# Patient Record
Sex: MALE | Race: WHITE | URBAN - METROPOLITAN AREA
[De-identification: names, ages, dates, MRNs, and addresses within clinical notes are randomized per-mention and may not be internally consistent; named-entity substitution may affect disease eponyms.]

---

## 2017-04-27 PROBLEM — Z00.00 ENCOUNTER FOR PREVENTIVE HEALTH EXAMINATION: Status: ACTIVE | Noted: 2017-04-27

## 2017-06-14 ENCOUNTER — INPATIENT (INPATIENT)
Facility: HOSPITAL | Age: 57
LOS: 0 days | Discharge: HOME | End: 2017-06-15

## 2017-06-14 DIAGNOSIS — N13.9 OBSTRUCTIVE AND REFLUX UROPATHY, UNSPECIFIED: ICD-10-CM

## 2017-06-14 DIAGNOSIS — I10 ESSENTIAL (PRIMARY) HYPERTENSION: ICD-10-CM

## 2017-06-14 DIAGNOSIS — E11.9 TYPE 2 DIABETES MELLITUS WITHOUT COMPLICATIONS: ICD-10-CM

## 2017-06-14 DIAGNOSIS — R10.9 UNSPECIFIED ABDOMINAL PAIN: ICD-10-CM

## 2017-06-14 DIAGNOSIS — R07.9 CHEST PAIN, UNSPECIFIED: ICD-10-CM

## 2017-06-14 DIAGNOSIS — E78.5 HYPERLIPIDEMIA, UNSPECIFIED: ICD-10-CM

## 2017-06-28 DIAGNOSIS — I10 ESSENTIAL (PRIMARY) HYPERTENSION: ICD-10-CM

## 2017-06-28 DIAGNOSIS — N40.0 BENIGN PROSTATIC HYPERPLASIA WITHOUT LOWER URINARY TRACT SYMPTOMS: ICD-10-CM

## 2017-06-28 DIAGNOSIS — K21.9 GASTRO-ESOPHAGEAL REFLUX DISEASE WITHOUT ESOPHAGITIS: ICD-10-CM

## 2017-06-28 DIAGNOSIS — Z79.84 LONG TERM (CURRENT) USE OF ORAL HYPOGLYCEMIC DRUGS: ICD-10-CM

## 2017-06-28 DIAGNOSIS — T36.95XA ADVERSE EFFECT OF UNSPECIFIED SYSTEMIC ANTIBIOTIC, INITIAL ENCOUNTER: ICD-10-CM

## 2017-06-28 DIAGNOSIS — R41.82 ALTERED MENTAL STATUS, UNSPECIFIED: ICD-10-CM

## 2017-06-28 DIAGNOSIS — R10.9 UNSPECIFIED ABDOMINAL PAIN: ICD-10-CM

## 2017-06-28 DIAGNOSIS — E11.9 TYPE 2 DIABETES MELLITUS WITHOUT COMPLICATIONS: ICD-10-CM

## 2017-06-28 DIAGNOSIS — R19.7 DIARRHEA, UNSPECIFIED: ICD-10-CM

## 2017-06-28 DIAGNOSIS — I25.10 ATHEROSCLEROTIC HEART DISEASE OF NATIVE CORONARY ARTERY WITHOUT ANGINA PECTORIS: ICD-10-CM

## 2017-06-28 DIAGNOSIS — N41.9 INFLAMMATORY DISEASE OF PROSTATE, UNSPECIFIED: ICD-10-CM

## 2017-06-28 DIAGNOSIS — E78.5 HYPERLIPIDEMIA, UNSPECIFIED: ICD-10-CM

## 2017-06-28 DIAGNOSIS — Z86.73 PERSONAL HISTORY OF TRANSIENT ISCHEMIC ATTACK (TIA), AND CEREBRAL INFARCTION WITHOUT RESIDUAL DEFICITS: ICD-10-CM

## 2017-06-28 DIAGNOSIS — Z86.72 PERSONAL HISTORY OF THROMBOPHLEBITIS: ICD-10-CM

## 2017-06-28 DIAGNOSIS — I25.2 OLD MYOCARDIAL INFARCTION: ICD-10-CM

## 2018-04-16 ENCOUNTER — EMERGENCY (EMERGENCY)
Facility: HOSPITAL | Age: 58
LOS: 0 days | Discharge: HOME | End: 2018-04-16
Attending: EMERGENCY MEDICINE

## 2018-04-16 VITALS
DIASTOLIC BLOOD PRESSURE: 86 MMHG | SYSTOLIC BLOOD PRESSURE: 150 MMHG | HEART RATE: 90 BPM | TEMPERATURE: 97 F | OXYGEN SATURATION: 98 % | RESPIRATION RATE: 18 BRPM | HEIGHT: 70 IN | WEIGHT: 214.07 LBS

## 2018-04-16 DIAGNOSIS — K59.00 CONSTIPATION, UNSPECIFIED: ICD-10-CM

## 2018-04-16 DIAGNOSIS — R14.0 ABDOMINAL DISTENSION (GASEOUS): ICD-10-CM

## 2018-04-16 DIAGNOSIS — R10.84 GENERALIZED ABDOMINAL PAIN: ICD-10-CM

## 2018-04-16 LAB
ALBUMIN SERPL ELPH-MCNC: 4.6 G/DL — SIGNIFICANT CHANGE UP (ref 3.5–5.2)
ALP SERPL-CCNC: 53 U/L — SIGNIFICANT CHANGE UP (ref 30–115)
ALT FLD-CCNC: 29 U/L — SIGNIFICANT CHANGE UP (ref 0–41)
ANION GAP SERPL CALC-SCNC: 13 MMOL/L — SIGNIFICANT CHANGE UP (ref 7–14)
AST SERPL-CCNC: 29 U/L — SIGNIFICANT CHANGE UP (ref 0–41)
BILIRUB SERPL-MCNC: 0.2 MG/DL — SIGNIFICANT CHANGE UP (ref 0.2–1.2)
BUN SERPL-MCNC: 33 MG/DL — HIGH (ref 10–20)
CALCIUM SERPL-MCNC: 10.1 MG/DL — SIGNIFICANT CHANGE UP (ref 8.5–10.1)
CHLORIDE SERPL-SCNC: 102 MMOL/L — SIGNIFICANT CHANGE UP (ref 98–110)
CO2 SERPL-SCNC: 24 MMOL/L — SIGNIFICANT CHANGE UP (ref 17–32)
CREAT SERPL-MCNC: 1.7 MG/DL — HIGH (ref 0.7–1.5)
GLUCOSE SERPL-MCNC: 153 MG/DL — HIGH (ref 70–99)
HCT VFR BLD CALC: 40.2 % — LOW (ref 42–52)
HGB BLD-MCNC: 13.5 G/DL — LOW (ref 14–18)
LIDOCAIN IGE QN: 22 U/L — SIGNIFICANT CHANGE UP (ref 7–60)
MCHC RBC-ENTMCNC: 29.2 PG — SIGNIFICANT CHANGE UP (ref 27–31)
MCHC RBC-ENTMCNC: 33.6 G/DL — SIGNIFICANT CHANGE UP (ref 32–37)
MCV RBC AUTO: 87 FL — SIGNIFICANT CHANGE UP (ref 80–94)
NRBC # BLD: 0 /100 WBCS — SIGNIFICANT CHANGE UP (ref 0–0)
PLATELET # BLD AUTO: 185 K/UL — SIGNIFICANT CHANGE UP (ref 130–400)
POTASSIUM SERPL-MCNC: 4.9 MMOL/L — SIGNIFICANT CHANGE UP (ref 3.5–5)
POTASSIUM SERPL-SCNC: 4.9 MMOL/L — SIGNIFICANT CHANGE UP (ref 3.5–5)
PROT SERPL-MCNC: 7.3 G/DL — SIGNIFICANT CHANGE UP (ref 6–8)
RBC # BLD: 4.62 M/UL — LOW (ref 4.7–6.1)
RBC # FLD: 14.5 % — SIGNIFICANT CHANGE UP (ref 11.5–14.5)
SODIUM SERPL-SCNC: 139 MMOL/L — SIGNIFICANT CHANGE UP (ref 135–146)
WBC # BLD: 7.78 K/UL — SIGNIFICANT CHANGE UP (ref 4.8–10.8)
WBC # FLD AUTO: 7.78 K/UL — SIGNIFICANT CHANGE UP (ref 4.8–10.8)

## 2018-04-16 RX ORDER — SODIUM CHLORIDE 9 MG/ML
1000 INJECTION INTRAMUSCULAR; INTRAVENOUS; SUBCUTANEOUS ONCE
Qty: 0 | Refills: 0 | Status: DISCONTINUED | OUTPATIENT
Start: 2018-04-16 | End: 2018-04-16

## 2018-04-16 RX ORDER — SODIUM CHLORIDE 9 MG/ML
3 INJECTION INTRAMUSCULAR; INTRAVENOUS; SUBCUTANEOUS ONCE
Qty: 0 | Refills: 0 | Status: COMPLETED | OUTPATIENT
Start: 2018-04-16 | End: 2018-04-16

## 2018-04-16 RX ADMIN — SODIUM CHLORIDE 3 MILLILITER(S): 9 INJECTION INTRAMUSCULAR; INTRAVENOUS; SUBCUTANEOUS at 17:21

## 2018-04-16 NOTE — ED PROVIDER NOTE - NS ED ROS FT
Cardiac:  No chest pain or sob.   Respiratory:  No cough or respiratory distress.  GI:  no nausea. no vomiting. no diarrhea.  (+) constipation   :  no dysuria. no burning on urination  MS:  no myalgias. no back pain  Neuro:  No headache or focal weakness  Skin:  No skin rash.

## 2018-04-16 NOTE — ED PROVIDER NOTE - MEDICAL DECISION MAKING DETAILS
CT negative for anything acute.  pt had BM and is feeling much better.  pt nontoxic, well appearing.  rectal exam with brown stool, no blood, no black stool.  pt dc with outpatient gi follow up.  pt understands importance of GI follow up.    Patient feeling better.  Pt dc with outpatient follow up.  Pt understands importance of outpatient follow up.  Pt given strict return precautions.

## 2018-04-16 NOTE — ED PROVIDER NOTE - ATTENDING CONTRIBUTION TO CARE
58 yo m who had recent colonoscopy 5 days ago presents with abd distention and pain and constipation.  pt was seen by GI who sent pt in for ct.  no fevers, noc hills, no nausea, no vomiting, no back pain, no headache, no blood in stool.  no cp, no sob.  no other complaints.  awake, alert.  lungs clear. MMM.  abd soft, distended with diffuse tenderness.    p:  ct, labs, ivf, reassess, xray

## 2018-04-16 NOTE — ED PROVIDER NOTE - OBJECTIVE STATEMENT
57 M presents for abd distention and constipation since colonoscopy on 4/11/18.  denies nausea/vomiting.  no urinary symptoms. no fever/chills

## 2018-04-16 NOTE — ED PROVIDER NOTE - PHYSICAL EXAMINATION
CONSTITUTIONAL: Well-developed; well-nourished; in no acute distress.   SKIN: warm, dry  EYES: no conj injection  ENT: No nasal discharge; airway clear.  CARD: S1, S2 normal  RESP: No wheezes, rales or rhonchi.  ABD: soft distended. diffuse tenderness. no rebound or guarding.  rectal: brown stool not impacted   EXT: Normal ROM.  normal gait.   NEURO: Alert, oriented, grossly unremarkable

## 2018-04-16 NOTE — ED PROVIDER NOTE - PROGRESS NOTE DETAILS
I spoke to RN at Dr. Gayle;'s office.  CT negative for anythign acute.  will give enema.  will continue to observe and if doing better will dc with outpatient GI Dr. Gayle follow up pt feeling better better after enema.  pt strained and had BM and feels better.  pt reported small drop of red blood with straining.  no blood in stool, no black stool.  pt states only small drop of blood.  I did a repeat rectal exam, no blood seen.  rectal with brown stool, no black stool, no melena.  pt wants to go home.  pt reports he feels better.  pt will follow up with Dr. Gayle this week.  pt understands importance of GI follow up.  pt given strict return precautions

## 2018-04-16 NOTE — ED ADULT TRIAGE NOTE - CHIEF COMPLAINT QUOTE
Pt sent in by MD for CT abd/pelvis to rule out obstruction.  Pt had colonoscopy last week and has not had a BM.

## 2018-04-17 NOTE — ED POST DISCHARGE NOTE - RESULT SUMMARY
I also informed pt of official xray finding on the lung about nodular density requiring CT scan.  Pt understands and will inform his pmd Dr. Wells about the finding and the need for outpatient Chest CT for further evaluation of this.  pt understands and will make sure to follow up.

## 2018-04-17 NOTE — ED POST DISCHARGE NOTE - ADDITIONAL DOCUMENTATION
I called pt to follow up.  Pt continues to have constipation.  no worsening symptoms.  no worsening abd pain.  no vomiting, no fevers, no chills.  no rectal bleeding, no blood in stool.  pt is using miralax at home and is going to try another enema.  pt is going to follow up with his GI this week.  pt understands importance of follow up.  pt given strict return precautions.  Pt denies any rectal bleeding or blood in stool. I called pt to follow up.  Pt continues to have constipation.  no worsening symptoms.  no worsening abd pain.  no vomiting, no fevers, no chills.  no rectal bleeding, no blood in stool.  pt is using miralax at home and is going to try another enema.  pt is going to follow up with his GI this week.  pt understands importance of follow up.  pt given strict return precautions.  Pt denies any rectal bleeding or blood in stool.  Pt also aware of slightly abnormal kidney function and IV contrast and understands importance staying hydrated over next few days.

## 2019-01-30 ENCOUNTER — INPATIENT (INPATIENT)
Facility: HOSPITAL | Age: 59
LOS: 3 days | Discharge: HOME | End: 2019-02-03
Payer: COMMERCIAL

## 2019-01-30 VITALS
TEMPERATURE: 96 F | RESPIRATION RATE: 17 BRPM | HEART RATE: 102 BPM | SYSTOLIC BLOOD PRESSURE: 127 MMHG | DIASTOLIC BLOOD PRESSURE: 60 MMHG | OXYGEN SATURATION: 96 %

## 2019-01-30 LAB
ALBUMIN SERPL ELPH-MCNC: 4.6 G/DL — SIGNIFICANT CHANGE UP (ref 3.5–5.2)
ALP SERPL-CCNC: 60 U/L — SIGNIFICANT CHANGE UP (ref 30–115)
ALT FLD-CCNC: 26 U/L — SIGNIFICANT CHANGE UP (ref 0–41)
ANION GAP SERPL CALC-SCNC: 16 MMOL/L — HIGH (ref 7–14)
APTT BLD: 27.9 SEC — SIGNIFICANT CHANGE UP (ref 27–39.2)
AST SERPL-CCNC: 18 U/L — SIGNIFICANT CHANGE UP (ref 0–41)
BASOPHILS # BLD AUTO: 0.04 K/UL — SIGNIFICANT CHANGE UP (ref 0–0.2)
BASOPHILS NFR BLD AUTO: 0.4 % — SIGNIFICANT CHANGE UP (ref 0–1)
BILIRUB SERPL-MCNC: 0.4 MG/DL — SIGNIFICANT CHANGE UP (ref 0.2–1.2)
BUN SERPL-MCNC: 44 MG/DL — HIGH (ref 10–20)
CALCIUM SERPL-MCNC: 9.4 MG/DL — SIGNIFICANT CHANGE UP (ref 8.5–10.1)
CHLORIDE SERPL-SCNC: 100 MMOL/L — SIGNIFICANT CHANGE UP (ref 98–110)
CO2 SERPL-SCNC: 24 MMOL/L — SIGNIFICANT CHANGE UP (ref 17–32)
CREAT SERPL-MCNC: 2.3 MG/DL — HIGH (ref 0.7–1.5)
EOSINOPHIL # BLD AUTO: 0.16 K/UL — SIGNIFICANT CHANGE UP (ref 0–0.7)
EOSINOPHIL NFR BLD AUTO: 1.8 % — SIGNIFICANT CHANGE UP (ref 0–8)
GLUCOSE BLDC GLUCOMTR-MCNC: 129 MG/DL — HIGH (ref 70–99)
GLUCOSE SERPL-MCNC: 156 MG/DL — HIGH (ref 70–99)
HCT VFR BLD CALC: 38.3 % — LOW (ref 42–52)
HGB BLD-MCNC: 13 G/DL — LOW (ref 14–18)
IMM GRANULOCYTES NFR BLD AUTO: 0.3 % — SIGNIFICANT CHANGE UP (ref 0.1–0.3)
INR BLD: 1.14 RATIO — SIGNIFICANT CHANGE UP (ref 0.65–1.3)
LYMPHOCYTES # BLD AUTO: 1.6 K/UL — SIGNIFICANT CHANGE UP (ref 1.2–3.4)
LYMPHOCYTES # BLD AUTO: 17.9 % — LOW (ref 20.5–51.1)
MCHC RBC-ENTMCNC: 29.4 PG — SIGNIFICANT CHANGE UP (ref 27–31)
MCHC RBC-ENTMCNC: 33.9 G/DL — SIGNIFICANT CHANGE UP (ref 32–37)
MCV RBC AUTO: 86.7 FL — SIGNIFICANT CHANGE UP (ref 80–94)
MONOCYTES # BLD AUTO: 0.82 K/UL — HIGH (ref 0.1–0.6)
MONOCYTES NFR BLD AUTO: 9.2 % — SIGNIFICANT CHANGE UP (ref 1.7–9.3)
NEUTROPHILS # BLD AUTO: 6.28 K/UL — SIGNIFICANT CHANGE UP (ref 1.4–6.5)
NEUTROPHILS NFR BLD AUTO: 70.4 % — SIGNIFICANT CHANGE UP (ref 42.2–75.2)
NRBC # BLD: 0 /100 WBCS — SIGNIFICANT CHANGE UP (ref 0–0)
PLATELET # BLD AUTO: 174 K/UL — SIGNIFICANT CHANGE UP (ref 130–400)
POTASSIUM SERPL-MCNC: 4.1 MMOL/L — SIGNIFICANT CHANGE UP (ref 3.5–5)
POTASSIUM SERPL-SCNC: 4.1 MMOL/L — SIGNIFICANT CHANGE UP (ref 3.5–5)
PROT SERPL-MCNC: 7.3 G/DL — SIGNIFICANT CHANGE UP (ref 6–8)
PROTHROM AB SERPL-ACNC: 12.3 SEC — SIGNIFICANT CHANGE UP (ref 9.95–12.87)
RBC # BLD: 4.42 M/UL — LOW (ref 4.7–6.1)
RBC # FLD: 14.3 % — SIGNIFICANT CHANGE UP (ref 11.5–14.5)
SODIUM SERPL-SCNC: 140 MMOL/L — SIGNIFICANT CHANGE UP (ref 135–146)
TROPONIN T SERPL-MCNC: 0.01 NG/ML — SIGNIFICANT CHANGE UP
WBC # BLD: 8.93 K/UL — SIGNIFICANT CHANGE UP (ref 4.8–10.8)
WBC # FLD AUTO: 8.93 K/UL — SIGNIFICANT CHANGE UP (ref 4.8–10.8)

## 2019-01-30 RX ORDER — ATORVASTATIN CALCIUM 80 MG/1
80 TABLET, FILM COATED ORAL AT BEDTIME
Qty: 0 | Refills: 0 | Status: DISCONTINUED | OUTPATIENT
Start: 2019-01-30 | End: 2019-02-03

## 2019-01-30 RX ORDER — GLUCAGON INJECTION, SOLUTION 0.5 MG/.1ML
1 INJECTION, SOLUTION SUBCUTANEOUS ONCE
Qty: 0 | Refills: 0 | Status: DISCONTINUED | OUTPATIENT
Start: 2019-01-30 | End: 2019-02-03

## 2019-01-30 RX ORDER — INSULIN LISPRO 100/ML
VIAL (ML) SUBCUTANEOUS
Qty: 0 | Refills: 0 | Status: DISCONTINUED | OUTPATIENT
Start: 2019-01-30 | End: 2019-01-31

## 2019-01-30 RX ORDER — DEXTROSE 50 % IN WATER 50 %
12.5 SYRINGE (ML) INTRAVENOUS ONCE
Qty: 0 | Refills: 0 | Status: DISCONTINUED | OUTPATIENT
Start: 2019-01-30 | End: 2019-02-03

## 2019-01-30 RX ORDER — TAMSULOSIN HYDROCHLORIDE 0.4 MG/1
0.4 CAPSULE ORAL AT BEDTIME
Qty: 0 | Refills: 0 | Status: DISCONTINUED | OUTPATIENT
Start: 2019-01-30 | End: 2019-01-31

## 2019-01-30 RX ORDER — ALTEPLASE 100 MG
9 KIT INTRAVENOUS ONCE
Qty: 0 | Refills: 0 | Status: COMPLETED | OUTPATIENT
Start: 2019-01-30 | End: 2019-01-30

## 2019-01-30 RX ORDER — ACETAMINOPHEN 500 MG
650 TABLET ORAL ONCE
Qty: 0 | Refills: 0 | Status: COMPLETED | OUTPATIENT
Start: 2019-01-30 | End: 2019-01-30

## 2019-01-30 RX ORDER — SODIUM CHLORIDE 9 MG/ML
1000 INJECTION, SOLUTION INTRAVENOUS
Qty: 0 | Refills: 0 | Status: DISCONTINUED | OUTPATIENT
Start: 2019-01-30 | End: 2019-02-03

## 2019-01-30 RX ORDER — DEXTROSE 50 % IN WATER 50 %
25 SYRINGE (ML) INTRAVENOUS ONCE
Qty: 0 | Refills: 0 | Status: DISCONTINUED | OUTPATIENT
Start: 2019-01-30 | End: 2019-02-03

## 2019-01-30 RX ORDER — INSULIN LISPRO 100/ML
3 VIAL (ML) SUBCUTANEOUS
Qty: 0 | Refills: 0 | Status: DISCONTINUED | OUTPATIENT
Start: 2019-01-30 | End: 2019-01-31

## 2019-01-30 RX ORDER — INSULIN LISPRO 100/ML
3 VIAL (ML) SUBCUTANEOUS
Qty: 0 | Refills: 0 | Status: DISCONTINUED | OUTPATIENT
Start: 2019-01-30 | End: 2019-02-01

## 2019-01-30 RX ORDER — TRIAMTERENE/HYDROCHLOROTHIAZID 75 MG-50MG
1 TABLET ORAL DAILY
Qty: 0 | Refills: 0 | Status: DISCONTINUED | OUTPATIENT
Start: 2019-01-30 | End: 2019-01-31

## 2019-01-30 RX ORDER — ALTEPLASE 100 MG
81 KIT INTRAVENOUS ONCE
Qty: 0 | Refills: 0 | Status: COMPLETED | OUTPATIENT
Start: 2019-01-30 | End: 2019-01-30

## 2019-01-30 RX ORDER — DEXTROSE 50 % IN WATER 50 %
15 SYRINGE (ML) INTRAVENOUS ONCE
Qty: 0 | Refills: 0 | Status: DISCONTINUED | OUTPATIENT
Start: 2019-01-30 | End: 2019-02-03

## 2019-01-30 RX ORDER — PANTOPRAZOLE SODIUM 20 MG/1
40 TABLET, DELAYED RELEASE ORAL
Qty: 0 | Refills: 0 | Status: DISCONTINUED | OUTPATIENT
Start: 2019-01-30 | End: 2019-02-03

## 2019-01-30 RX ORDER — TAMSULOSIN HYDROCHLORIDE 0.4 MG/1
1 CAPSULE ORAL
Qty: 0 | Refills: 0 | COMMUNITY

## 2019-01-30 RX ORDER — AMLODIPINE BESYLATE 2.5 MG/1
10 TABLET ORAL DAILY
Qty: 0 | Refills: 0 | Status: DISCONTINUED | OUTPATIENT
Start: 2019-01-30 | End: 2019-01-31

## 2019-01-30 RX ORDER — INSULIN GLARGINE 100 [IU]/ML
10 INJECTION, SOLUTION SUBCUTANEOUS AT BEDTIME
Qty: 0 | Refills: 0 | Status: DISCONTINUED | OUTPATIENT
Start: 2019-01-30 | End: 2019-01-31

## 2019-01-30 RX ORDER — LISINOPRIL 2.5 MG/1
40 TABLET ORAL DAILY
Qty: 0 | Refills: 0 | Status: DISCONTINUED | OUTPATIENT
Start: 2019-01-30 | End: 2019-01-31

## 2019-01-30 RX ADMIN — ALTEPLASE 9 MILLIGRAM(S): KIT at 14:46

## 2019-01-30 RX ADMIN — Medication 650 MILLIGRAM(S): at 18:21

## 2019-01-30 RX ADMIN — ATORVASTATIN CALCIUM 80 MILLIGRAM(S): 80 TABLET, FILM COATED ORAL at 21:55

## 2019-01-30 RX ADMIN — ALTEPLASE 540 MILLIGRAM(S): KIT at 14:44

## 2019-01-30 RX ADMIN — TAMSULOSIN HYDROCHLORIDE 0.4 MILLIGRAM(S): 0.4 CAPSULE ORAL at 21:55

## 2019-01-30 RX ADMIN — INSULIN GLARGINE 10 UNIT(S): 100 INJECTION, SOLUTION SUBCUTANEOUS at 21:55

## 2019-01-30 RX ADMIN — Medication 650 MILLIGRAM(S): at 19:45

## 2019-01-30 RX ADMIN — ALTEPLASE 81 MILLIGRAM(S): KIT at 14:46

## 2019-01-30 RX ADMIN — ALTEPLASE 81 MILLIGRAM(S): KIT at 15:41

## 2019-01-30 NOTE — ED PROVIDER NOTE - NS ED ROS FT
Review of Systems:  	•	CONSTITUTIONAL - no fever, no diaphoresis, no chills  	•	SKIN - no rash  	•	HEMATOLOGIC - no bleeding, no bruising  	•	EYES - no eye pain, no blurry vision  	•	ENT - no change in hearing, no sore throat, no ear pain or tinnitus  	•	RESPIRATORY - no shortness of breath, no cough  	•	CARDIAC - no chest pain, no palpitations  	•	GI - no abd pain, no nausea, no vomiting, no diarrhea, no constipation  	•	GENITO-URINARY - no discharge, no dysuria; no hematuria, no increased urinary frequency  	•	MUSCULOSKELETAL - no joint paint, no swelling, no redness  	•	NEUROLOGIC - weakness to right side, no headache, right side paresthesias, no LOC  	•	PSYCH - no anxiety, non suicidal, non homicidal, no hallucination, no depression

## 2019-01-30 NOTE — ED ADULT NURSE NOTE - PMH
CVA (cerebral vascular accident)    HTN (hypertension) CVA (cerebral vascular accident)    Diabetes    High cholesterol    HTN (hypertension)

## 2019-01-30 NOTE — ED PROVIDER NOTE - OBJECTIVE STATEMENT
this is 59 yo male who presents to ed for evaluation. patient states around 12 he started to have a pain to his right arm . patient then experienced heaviness and numbness to his arm. patient has a previous stroke history. patient got into his car and drove to the ed. patient denies any chest pain for shortness of breath.

## 2019-01-30 NOTE — ED PROVIDER NOTE - CRITICAL CARE PROVIDED
additional history taking/interpretation of diagnostic studies/consultation with other physicians/documentation/direct patient care (not related to procedure)/consult w/ pt's family directly relating to pts condition

## 2019-01-30 NOTE — ED ADULT NURSE NOTE - CHPI ED NUR SYMPTOMS NEG
no fever/no confusion/no blurred vision/no nausea/no numbness/no change in level of consciousness/no loss of consciousness/no vomiting

## 2019-01-30 NOTE — CONSULT NOTE ADULT - ASSESSMENT
IMPRESSION:  Stroke s/p TPA       PLAN:    CNS: neruo check Q 1 hrs   neurology follow up   MRi     HEENT: oral care     PULMONARY: keep pox > 92%    CARDIOVASCULAR: echo , start asa 24 hrs after TPA   start lipitor     GI: GI prophylaxis.  speech and swallow eval     RENAL: follow lytes     INFECTIOUS DISEASE: no abx     HEMATOLOGICAL:  DVT prophylaxis. sequential start heparin sq aurora afternoon for dvt prophylaxis     ENDOCRINE:  Follow up FS.  Insulin protocol if needed.    Icu monitoring IMPRESSION:  Stroke s/p TPA       PLAN:    CNS: neruo check Q 1/2 hrs for 6 hrs and then Q 1 hrs CT scan in 24 hrs   neurology follow up   MRi     HEENT: oral care     PULMONARY: keep pox > 92%    CARDIOVASCULAR: echo , start asa 24 hrs after TPA   start lipitor   keep SBP > 180  GI: GI prophylaxis.  speech and swallow eval     RENAL: follow lytes     INFECTIOUS DISEASE: no abx     HEMATOLOGICAL:  DVT prophylaxis. sequential start heparin sq [place] afternoon for dvt prophylaxis     ENDOCRINE:  Follow up FS.  Insulin protocol if needed.    Icu monitoring

## 2019-01-30 NOTE — ED PROVIDER NOTE - ATTENDING CONTRIBUTION TO CARE
57 yo M h/o HTN, stroke 3 yrs ago, had right sided deficit but per family recovered presents with c/o feeling dizzy with heaviness to right arm that began 2 hrs ago.  + numb feeling, having some trouble finding the words.  Pt drove himself to the ED, no CP, no SOB.  On exam pt alert AAO x 3, follows commands, tearful at times, face symmetrical, speech is slow, slightly slurred, 59 yo M h/o HTN, stroke 3 yrs ago, had right sided deficit but per family recovered presents with c/o feeling dizzy with heaviness to right arm that began 2 hrs ago.  + numb feeling, having some trouble finding the words.  Pt drove himself to the ED, no CP, no SOB.  On exam pt alert AAO x 3, follows commands, tearful at times, face with slight right sided droop, speech is slow, slightly slurred, PERRL, tongue is midline, decreased motor right side, unable to hold arm against gravity, walked into ED with limp (not his baseline per family), decreased sensation right sided    STOKE CODE CALLED

## 2019-01-30 NOTE — ED PROVIDER NOTE - PHYSICAL EXAMINATION
--EXAM--  VITAL SIGNS: I have reviewed vs documented at present.  CONSTITUTIONAL: Well-developed; well-nourished; in no acute distress.   SKIN: Warm and dry, no acute rash.   HEAD: Normocephalic; atraumatic.  EYES: PERRL, EOM intact; conjunctiva and sclera clear. No nystagmus.  ENT: No nasal discharge; airway clear.  NECK: Supple; non tender.  CARD: S1, S2, Regular rate and rhythm.   RESP: No wheezes, rales or rhonchi.  ABD: Normal bowel sounds; soft; non-distended; non-tender.  EXT: Normal ROM.   NEURO: Alert, oriented, grossly unremarkable. right side weakness noted to upper extremity decrease sensation   PSYCH: Cooperative, appropriate.

## 2019-01-30 NOTE — ED PROVIDER NOTE - CHPI ED SYMPTOMS NEG
no loss of consciousness/no nausea/no numbness/no vomiting no confusion/no loss of consciousness/no nausea/no numbness/no vomiting

## 2019-01-30 NOTE — ED PROVIDER NOTE - MEDICAL DECISION MAKING DETAILS
Stroke code called.  Pt evaluated via tele stroke and received Tpa.  Plan for admission ICU after CTA done.  Family updated on details of pts care.  Dr Wells aware, case d/w Dr William

## 2019-01-30 NOTE — CONSULT NOTE ADULT - SUBJECTIVE AND OBJECTIVE BOX
PATIENT IS A 57 YO MAN WITH HX OF CVA 3 YEARS AGO, NO RESIDUAL, HTN, DLD ON ASA AT HOME WHO PRESENTS 2 HRS AFTER ONSET OF INTERMITTENT EXPRESSIVE APHASIA AND R HEMIPARESIS  PATIENT WAS AT HOME AT THE TIME  NO CONTRAINDICATIONS TO TPA  EXAM DONE VIA TELESTROKE    STROKE CODE WAS CALLED AT 1352  TELESTROKE FROM [id number]    EXAM:  /60 HR 96   AWAKE AND ALERT. MS INTACT  MILD R CENTRAL FACIAL. NO DYSARTHRIA, NO OTHER CR NN DEFICITS  NO LANGUAGE DYSFUNCTION/APHASIA  STRENGTH FULL ON THE LEFT  R HEMIPLEGIA  DECREASED SENS RLE  NO NEGLECT  NO DYSMETRIA ON UNAFFECTED SIDE    NIHSS 10    PLATELETS 174  INR 1.14  CTH NO BLEED NO ACUTE CHANGE  ASPECT SCORE 10  MODIFIED DARREN SCALE 0    IMPRESSION  57 YO MAN WITH ACUTE ONSET OF R HEMIPLEGIA. PATIENT WITH EXPRESSIVE APHASIA BY HX BUT NOT ON MY EXAM. NIHSS 10.  TPA WAS ADMINISTERED. 9 MG BOLUS WAS GIVEN AT 1444 FOLLOWED BY 81 MG DRIP OVER ONE HOUR. PATIENT 101 KG    PLAN: PATIENT FOR CTA H/N GIVEN SYMPTOMS OF EXPRESSIVE APHASIA BY HX  IF LVO THEN WILL TRANSFER NORTH  IF NOT THEN ADMIT TO ICU - POST TPA PROTOCOL  Q 15 MIN NEURO CHECKS AND VITALS FOR 2 HRS, Q HALF HOUR NEURO CHECKS AND VITALS FOR 6 HOURS  AND Q 1 HR NEURO CHECKS AND VITALS FOR 24 HRS  NO ANTIPLATELET OR ANTICOAGULATION UNTIL 24 HOUR REPEAT HEAD CTH  MAINTAIN SBP > 185 AND DBP <110  HOLD BLOOD DRAWS FOR 24 HRS  REPEAT CTH AT 24 HOURS IF NO WORSENING OVERNIGHT  LOW THRESHOLD TO RESCAN IF ANY CHANGE IN NEURO STATUS  -2D ECHO  MRI BRAIN NC (AFTER 24 HRS)  PLEASE CALL WITH ANY QUESTIONS

## 2019-01-30 NOTE — ED PROVIDER NOTE - PROGRESS NOTE DETAILS
spoke to neurologist.   she will examine patient on tele stroke. Pt evaluated via tele stoke and tpa initiated,  Daughter and niece at bedside.  They understand risks and benefits.  Pt to CTA . spoke to dr vargas intenstivist accepts icu spoke to dr silveira accepts dr vargas spoke to patient . patient told him he had worse headache. he wants patient to have a ct head   ordered a ct noncontrast. of head Repeat CT done, prelim read, no bleed.  Dr William made aware, will admit.  Pt with improved movement of KRISTIN

## 2019-01-30 NOTE — CONSULT NOTE ADULT - SUBJECTIVE AND OBJECTIVE BOX
Patient is a 58y old  Male who presents with a chief complaint of right weakness     HPI:STACEY IS A 59 YO MAN WITH HX OF CVA 3 YEARS AGO, NO RESIDUAL, HTN, DLD ON ASA AT HOME WHO PRESENTS 2 HRS AFTER ONSET OF INTERMITTENT EXPRESSIVE APHASIA AND R HEMIPARESIS  PATIENT WAS AT HOME AT THE TIME stroke code was called s/p TPA symptom improved a little his aphasia improved and the weakness in right ext improve somehowe   but was complaining of headache so ct stat was done no bleed     PAST MEDICAL & SURGICAL HISTORY:  Stroke     Allergies: No Known Allergies          Family history : no cardiovscular family history     Home Medications: ASA      Occupation:  Alochol: Denied  Smoking: ex  Drug Use: Denied  Marital Status:         ROS: as in HPI; All other systems reviewed are negative    ICU Vital Signs Last 24 Hrs  T(C): 36.9 (30 Jan 2019 16:44), Max: 36.9 (30 Jan 2019 15:29)  T(F): 98.4 (30 Jan 2019 16:44), Max: 98.4 (30 Jan 2019 15:29)  HR: 95 (30 Jan 2019 16:44) (92 - 111)  BP: 123/71 (30 Jan 2019 16:44) (117/65 - 140/74)  BP(mean): --  ABP: --  ABP(mean): --  RR: 16 (30 Jan 2019 16:44) (16 - 18)  SpO2: 96% (30 Jan 2019 16:44) (94% - 98%)        Physical Examination:    General: No acute distress.  Alert, oriented, interactive, nonfocal    HEENT: Pupils equal, reactive to light.  Symmetric.    PULM: Clear to auscultation bilaterally, no significant sputum production    CVS: Regular rate and rhythm, no murmurs, rubs, or gallops    ABD: Soft, nondistended, nontender, normoactive bowel sounds, no masses    EXT: No edema, nontender, no clubbing     SKIN: Warm and well perfused, no rashes noted.    Neurology : right 2/5 upper and lower ext     Musculoskeletal : move all extremity     Lymphatic system: no Palpable node               I&O's Detail        LABS:                        13.0   8.93  )-----------( 174      ( 30 Jan 2019 14:12 )             38.3     30 Jan 2019 14:12    140    |  100    |  44     ----------------------------<  156    4.1     |  24     |  2.3      Ca    9.4        30 Jan 2019 14:12    TPro  7.3    /  Alb  4.6    /  TBili  0.4    /  DBili  x      /  AST  18     /  ALT  26     /  AlkPhos  60     30 Jan 2019 14:12  Amylase x     lipase x          CARDIAC MARKERS ( 30 Jan 2019 14:32 )  x     / 0.01 ng/mL / x     / x     / x          CAPILLARY BLOOD GLUCOSE      POCT Blood Glucose.: 178 mg/dL (30 Jan 2019 13:49)    PT/INR - ( 30 Jan 2019 14:12 )   PT: 12.30 sec;   INR: 1.14 ratio         PTT - ( 30 Jan 2019 14:12 )  PTT:27.9 sec    Culture        MEDICATIONS  (STANDING):    MEDICATIONS  (PRN):        RADIOLOGY: ***   < from: CT Angio Head w/ IV Cont (01.30.19 @ 15:15) >  No significant carotid artery stenosis.     Moderate focal stenosis of the distal V4 segment of the right vertebral   artery, appears slightly improved from the prior exam.     CTA head: No vascular filling defect to suggest thromboembolus.     < end of copied text >  < from: CT Head No Cont (01.30.19 @ 17:10) >  No CT evidence of acute intracranial pathology. Stable short-term   follow-up examination.    Chronic lacunar infarcts as above.    < end of copied text >    CXR:  TLC:  OG:  ET tube:          ECHO:

## 2019-01-30 NOTE — ED ADULT NURSE REASSESSMENT NOTE - NS ED NURSE REASSESS COMMENT FT1
Pt has been closely monitored since arrival. Pt's symptoms have improved since arrival. Initial NIH Stroke assessment by RN was 9; At present assessment now 4. Right upper extremity getting stronger; right lower extremity still weak. Pt A & O X 4. Pt had complained of headache after receiving TPA; Tylenol 650 mg given PO (after successfully completing dysphagia screening). Pt confirms headache resolving. Pt has been admitted to CCU. Verbal report given to receiving RN. Await arrival of transport.

## 2019-01-31 LAB
ALBUMIN SERPL ELPH-MCNC: 4.4 G/DL — SIGNIFICANT CHANGE UP (ref 3.5–5.2)
ALP SERPL-CCNC: 61 U/L — SIGNIFICANT CHANGE UP (ref 30–115)
ALT FLD-CCNC: 25 U/L — SIGNIFICANT CHANGE UP (ref 0–41)
ANION GAP SERPL CALC-SCNC: 15 MMOL/L — HIGH (ref 7–14)
APTT BLD: 27.9 SEC — SIGNIFICANT CHANGE UP (ref 27–39.2)
AST SERPL-CCNC: 16 U/L — SIGNIFICANT CHANGE UP (ref 0–41)
BILIRUB SERPL-MCNC: 0.7 MG/DL — SIGNIFICANT CHANGE UP (ref 0.2–1.2)
BUN SERPL-MCNC: 36 MG/DL — HIGH (ref 10–20)
CALCIUM SERPL-MCNC: 8.8 MG/DL — SIGNIFICANT CHANGE UP (ref 8.5–10.1)
CHLORIDE SERPL-SCNC: 104 MMOL/L — SIGNIFICANT CHANGE UP (ref 98–110)
CO2 SERPL-SCNC: 22 MMOL/L — SIGNIFICANT CHANGE UP (ref 17–32)
CREAT SERPL-MCNC: 1.5 MG/DL — SIGNIFICANT CHANGE UP (ref 0.7–1.5)
ESTIMATED AVERAGE GLUCOSE: 160 MG/DL — HIGH (ref 68–114)
GLUCOSE BLDC GLUCOMTR-MCNC: 117 MG/DL — HIGH (ref 70–99)
GLUCOSE BLDC GLUCOMTR-MCNC: 119 MG/DL — HIGH (ref 70–99)
GLUCOSE BLDC GLUCOMTR-MCNC: 134 MG/DL — HIGH (ref 70–99)
GLUCOSE BLDC GLUCOMTR-MCNC: 170 MG/DL — HIGH (ref 70–99)
GLUCOSE BLDC GLUCOMTR-MCNC: 177 MG/DL — HIGH (ref 70–99)
GLUCOSE BLDC GLUCOMTR-MCNC: 180 MG/DL — HIGH (ref 70–99)
GLUCOSE BLDC GLUCOMTR-MCNC: 183 MG/DL — HIGH (ref 70–99)
GLUCOSE BLDC GLUCOMTR-MCNC: 187 MG/DL — HIGH (ref 70–99)
GLUCOSE BLDC GLUCOMTR-MCNC: 191 MG/DL — HIGH (ref 70–99)
GLUCOSE BLDC GLUCOMTR-MCNC: 228 MG/DL — HIGH (ref 70–99)
GLUCOSE BLDC GLUCOMTR-MCNC: 253 MG/DL — HIGH (ref 70–99)
GLUCOSE BLDC GLUCOMTR-MCNC: 257 MG/DL — HIGH (ref 70–99)
GLUCOSE BLDC GLUCOMTR-MCNC: 276 MG/DL — HIGH (ref 70–99)
GLUCOSE SERPL-MCNC: 245 MG/DL — HIGH (ref 70–99)
HBA1C BLD-MCNC: 7.2 % — HIGH (ref 4–5.6)
HCT VFR BLD CALC: 41.1 % — LOW (ref 42–52)
HGB BLD-MCNC: 13.7 G/DL — LOW (ref 14–18)
INR BLD: 1.18 RATIO — SIGNIFICANT CHANGE UP (ref 0.65–1.3)
MAGNESIUM SERPL-MCNC: 2.1 MG/DL — SIGNIFICANT CHANGE UP (ref 1.8–2.4)
MCHC RBC-ENTMCNC: 29.1 PG — SIGNIFICANT CHANGE UP (ref 27–31)
MCHC RBC-ENTMCNC: 33.3 G/DL — SIGNIFICANT CHANGE UP (ref 32–37)
MCV RBC AUTO: 87.4 FL — SIGNIFICANT CHANGE UP (ref 80–94)
NRBC # BLD: 0 /100 WBCS — SIGNIFICANT CHANGE UP (ref 0–0)
PHOSPHATE SERPL-MCNC: 3.3 MG/DL — SIGNIFICANT CHANGE UP (ref 2.1–4.9)
PLATELET # BLD AUTO: 246 K/UL — SIGNIFICANT CHANGE UP (ref 130–400)
POTASSIUM SERPL-MCNC: 4 MMOL/L — SIGNIFICANT CHANGE UP (ref 3.5–5)
POTASSIUM SERPL-SCNC: 4 MMOL/L — SIGNIFICANT CHANGE UP (ref 3.5–5)
PROT SERPL-MCNC: 6.9 G/DL — SIGNIFICANT CHANGE UP (ref 6–8)
PROTHROM AB SERPL-ACNC: 12.7 SEC — SIGNIFICANT CHANGE UP (ref 9.95–12.87)
RBC # BLD: 4.7 M/UL — SIGNIFICANT CHANGE UP (ref 4.7–6.1)
RBC # FLD: 14.2 % — SIGNIFICANT CHANGE UP (ref 11.5–14.5)
SODIUM SERPL-SCNC: 141 MMOL/L — SIGNIFICANT CHANGE UP (ref 135–146)
WBC # BLD: 16.62 K/UL — HIGH (ref 4.8–10.8)
WBC # FLD AUTO: 16.62 K/UL — HIGH (ref 4.8–10.8)

## 2019-01-31 PROCEDURE — 93970 EXTREMITY STUDY: CPT | Mod: 26

## 2019-01-31 RX ORDER — INSULIN LISPRO 100/ML
VIAL (ML) SUBCUTANEOUS
Qty: 0 | Refills: 0 | Status: DISCONTINUED | OUTPATIENT
Start: 2019-01-31 | End: 2019-01-31

## 2019-01-31 RX ORDER — SODIUM CHLORIDE 9 MG/ML
1000 INJECTION INTRAMUSCULAR; INTRAVENOUS; SUBCUTANEOUS ONCE
Qty: 0 | Refills: 0 | Status: COMPLETED | OUTPATIENT
Start: 2019-01-31 | End: 2019-01-31

## 2019-01-31 RX ORDER — INSULIN LISPRO 100/ML
5 VIAL (ML) SUBCUTANEOUS
Qty: 0 | Refills: 0 | Status: DISCONTINUED | OUTPATIENT
Start: 2019-01-31 | End: 2019-01-31

## 2019-01-31 RX ORDER — INSULIN HUMAN 100 [IU]/ML
3 INJECTION, SOLUTION SUBCUTANEOUS
Qty: 50 | Refills: 0 | Status: DISCONTINUED | OUTPATIENT
Start: 2019-01-31 | End: 2019-01-31

## 2019-01-31 RX ORDER — INSULIN HUMAN 100 [IU]/ML
3 INJECTION, SOLUTION SUBCUTANEOUS
Qty: 100 | Refills: 0 | Status: DISCONTINUED | OUTPATIENT
Start: 2019-01-31 | End: 2019-02-02

## 2019-01-31 RX ORDER — INSULIN GLARGINE 100 [IU]/ML
21 INJECTION, SOLUTION SUBCUTANEOUS AT BEDTIME
Qty: 0 | Refills: 0 | Status: DISCONTINUED | OUTPATIENT
Start: 2019-01-31 | End: 2019-01-31

## 2019-01-31 RX ORDER — SODIUM CHLORIDE 9 MG/ML
1000 INJECTION INTRAMUSCULAR; INTRAVENOUS; SUBCUTANEOUS
Qty: 0 | Refills: 0 | Status: DISCONTINUED | OUTPATIENT
Start: 2019-01-31 | End: 2019-02-01

## 2019-01-31 RX ORDER — CLOPIDOGREL BISULFATE 75 MG/1
75 TABLET, FILM COATED ORAL DAILY
Qty: 0 | Refills: 0 | Status: DISCONTINUED | OUTPATIENT
Start: 2019-01-31 | End: 2019-02-03

## 2019-01-31 RX ORDER — ACETAMINOPHEN 500 MG
650 TABLET ORAL EVERY 6 HOURS
Qty: 0 | Refills: 0 | Status: DISCONTINUED | OUTPATIENT
Start: 2019-01-31 | End: 2019-02-03

## 2019-01-31 RX ORDER — NOREPINEPHRINE BITARTRATE/D5W 8 MG/250ML
0.05 PLASTIC BAG, INJECTION (ML) INTRAVENOUS
Qty: 8 | Refills: 0 | Status: DISCONTINUED | OUTPATIENT
Start: 2019-01-31 | End: 2019-02-03

## 2019-01-31 RX ORDER — HEPARIN SODIUM 5000 [USP'U]/ML
5000 INJECTION INTRAVENOUS; SUBCUTANEOUS EVERY 8 HOURS
Qty: 0 | Refills: 0 | Status: DISCONTINUED | OUTPATIENT
Start: 2019-01-31 | End: 2019-02-03

## 2019-01-31 RX ADMIN — Medication 2: at 08:06

## 2019-01-31 RX ADMIN — PANTOPRAZOLE SODIUM 40 MILLIGRAM(S): 20 TABLET, DELAYED RELEASE ORAL at 07:42

## 2019-01-31 RX ADMIN — ATORVASTATIN CALCIUM 80 MILLIGRAM(S): 80 TABLET, FILM COATED ORAL at 21:29

## 2019-01-31 RX ADMIN — SODIUM CHLORIDE 1000 MILLILITER(S): 9 INJECTION INTRAMUSCULAR; INTRAVENOUS; SUBCUTANEOUS at 00:07

## 2019-01-31 RX ADMIN — CLOPIDOGREL BISULFATE 75 MILLIGRAM(S): 75 TABLET, FILM COATED ORAL at 15:47

## 2019-01-31 RX ADMIN — Medication 8.91 MICROGRAM(S)/KG/MIN: at 02:47

## 2019-01-31 RX ADMIN — INSULIN HUMAN 3 UNIT(S)/HR: 100 INJECTION, SOLUTION SUBCUTANEOUS at 12:10

## 2019-01-31 RX ADMIN — SODIUM CHLORIDE 1000 MILLILITER(S): 9 INJECTION INTRAMUSCULAR; INTRAVENOUS; SUBCUTANEOUS at 01:11

## 2019-01-31 RX ADMIN — Medication 650 MILLIGRAM(S): at 15:48

## 2019-01-31 RX ADMIN — Medication 3 UNIT(S): at 08:05

## 2019-01-31 RX ADMIN — Medication 8.91 MICROGRAM(S)/KG/MIN: at 07:06

## 2019-01-31 RX ADMIN — HEPARIN SODIUM 5000 UNIT(S): 5000 INJECTION INTRAVENOUS; SUBCUTANEOUS at 21:29

## 2019-01-31 RX ADMIN — Medication 8.91 MICROGRAM(S)/KG/MIN: at 07:41

## 2019-01-31 RX ADMIN — SODIUM CHLORIDE 75 MILLILITER(S): 9 INJECTION INTRAMUSCULAR; INTRAVENOUS; SUBCUTANEOUS at 02:47

## 2019-01-31 RX ADMIN — Medication 650 MILLIGRAM(S): at 15:47

## 2019-01-31 RX ADMIN — Medication 8.91 MICROGRAM(S)/KG/MIN: at 12:26

## 2019-01-31 RX ADMIN — SODIUM CHLORIDE 75 MILLILITER(S): 9 INJECTION INTRAMUSCULAR; INTRAVENOUS; SUBCUTANEOUS at 07:41

## 2019-01-31 RX ADMIN — Medication 8.91 MICROGRAM(S)/KG/MIN: at 20:44

## 2019-01-31 NOTE — CHART NOTE - NSCHARTNOTEFT_GEN_A_CORE
get called by nurse for low BP patient seen and examined stable neuro exam   did not respond to IV fluid   will start levophed target keep BP > 150 for now

## 2019-01-31 NOTE — CHART NOTE - NSCHARTNOTEFT_GEN_A_CORE
Pt admitted with ischemic stroke s/p tpa. Pt is hypotensive w/ SBP of 80s s/p 2L NS bolus. Will start low Levophed and titrate to SBP > 120. Will follow Neuro recs for post-tpa protocol by keeping SBP < 185 and DBP < 110. Discussed with Dr. William Pt admitted with ischemic stroke s/p tpa. Pt is hypotensive w/ SBP of 80s s/p 2L NS bolus. No neuro status changes. Will start low Levophed and titrate to SBP > 120. Will follow Neuro recs for post-tpa protocol by keeping SBP < 185 and DBP < 110. Discussed with Dr. William Pt admitted with ischemic stroke s/p tpa. Pt is hypotensive w/ SBP of 80s s/p 2L NS bolus. No neuro status changes. Will start low dose Levophed to increase BP (titrate to SBP > 140s) to avoid hypoperfusion. Will follow Neuro recs for post-tpa protocol by keeping SBP < 185 and DBP < 110. Discussed with Dr. William

## 2019-01-31 NOTE — H&P ADULT - ASSESSMENT
#stroke s/p tpa  - admit to ICU  - q1h neurochecks till 24 hours after tpa then q4h  - start plavix   - 2decho  - repeat cth today  - goal -180 for 48 hours   -MRI when more stable  no need for mra or carotid duplex, cta results above  high intensity statin  check lipid profile, a1c  family requesting venous duplex    #hypotension likely SIRS r/o infectious etiology  -levophed   -cxr, ua and cx, blood culture  -hold all antihypertensives    #dm  monitor fs  avoid hyperglycemia (worse outcomes with stroke patients)  insulin gtt for now #stroke s/p tpa  - admit to ICU  - q1h neurochecks till 24 hours after tpa then q4h  - start plavix   - 2decho  - repeat cth today  - goal -180 for 48 hours   -MRI when more stable  no need for mra or carotid duplex, cta results above  high intensity statin  check lipid profile, a1c  family requesting venous duplex so will order it    #hypotension likely SIRS r/o infectious etiology, clinically pt with no focal c/o  -levophed   -cxr, ua and cx, blood culture  -hold all antihypertensives    #dm  monitor fs  avoid hyperglycemia (worse outcomes with stroke patients)  insulin gtt for now

## 2019-01-31 NOTE — H&P ADULT - HISTORY OF PRESENT ILLNESS
59 yo male with PMH of CVA 3 yrs ago no residual deficits on aspirin and statin, DLD, HTN p/w expressive aphasia and right sided hemiparesis. presented 2 hours after the onset of symptoms. Was given TPA in the ED, with mild improvement in his weakness, resolution of his le numbness, and resolution of his aphasia    NIHS at presentation 9

## 2019-01-31 NOTE — CONSULT NOTE ADULT - SUBJECTIVE AND OBJECTIVE BOX
HPI:  57 yo male with PMH of CVA 3 yrs ago no residual deficits on aspirin and statin, DLD, HTN p/w expressive aphasia and right sided hemiparesis. presented 2 hours after the onset of symptoms. Was given TPA in the ED, with mild improvement in his weakness, resolution of his le numbness, and resolution of his aphasia  PTN  REFERRED TO ACUTE  REHAB  FOR  EVAL AND  TX   PAST MEDICAL & SURGICAL HISTORY:  High cholesterol  Diabetes  HTN (hypertension)  CVA (cerebral vascular accident)      Hospital Course:    TODAY'S SUBJECTIVE & REVIEW OF SYMPTOMS:     Constitutional WNL   Cardio WNL   Resp WNL   GI WNL  Heme WNL  Endo WNL  Skin WNL  MSK WNL  Neuro WNL  Cognitive WNL  Psych WNL      MEDICATIONS  (STANDING):  atorvastatin 80 milliGRAM(s) Oral at bedtime  clopidogrel Tablet 75 milliGRAM(s) Oral daily  dextrose 5%. 1000 milliLiter(s) (50 mL/Hr) IV Continuous <Continuous>  dextrose 50% Injectable 12.5 Gram(s) IV Push once  dextrose 50% Injectable 25 Gram(s) IV Push once  dextrose 50% Injectable 25 Gram(s) IV Push once  heparin  Injectable 5000 Unit(s) SubCutaneous every 8 hours  insulin Infusion 3 Unit(s)/Hr (3 mL/Hr) IV Continuous <Continuous>  insulin lispro Injectable (HumaLOG) 3 Unit(s) SubCutaneous before dinner  norepinephrine Infusion 0.05 MICROgram(s)/kG/Min (8.906 mL/Hr) IV Continuous <Continuous>  pantoprazole    Tablet 40 milliGRAM(s) Oral before breakfast  sodium chloride 0.9%. 1000 milliLiter(s) (75 mL/Hr) IV Continuous <Continuous>    MEDICATIONS  (PRN):  acetaminophen   Tablet .. 650 milliGRAM(s) Oral every 6 hours PRN Mild Pain (1 - 3)  dextrose 40% Gel 15 Gram(s) Oral once PRN Blood Glucose LESS THAN 70 milliGRAM(s)/deciliter  glucagon  Injectable 1 milliGRAM(s) IntraMuscular once PRN Glucose LESS THAN 70 milligrams/deciliter      FAMILY HISTORY:  Family history of diabetes mellitus (DM) (Mother)  Family history of hypertension (Mother)      Allergies    No Known Allergies    Intolerances        SOCIAL HISTORY:    [  ] Etoh  [  ] Smoking  [  ] Substance abuse     Home Environment:  [  ] Home Alone  [ x ] Lives with Family  [  ] Home Health Aid    Dwelling:  [  ] Apartment  [ x ] Private House  [  ] Adult Home  [  ] Skilled Nursing Facility      [  ] Short Term  [  ] Long Term  [ x ] Stairs       Elevator [  ]    FUNCTIONAL STATUS PTA: (Check all that apply)  Ambulation: [ x ]Independent    [  ] Dependent     [  ] Non-Ambulatory  Assistive Device: [  x] SA Cane  [  ]  Q Cane  [  ] Walker  [  ]  Wheelchair  ADL : [ x ] Independent  [  ]  Dependent       Vital Signs Last 24 Hrs  T(C): 36.1 (31 Jan 2019 15:43), Max: 37.2 (31 Jan 2019 13:00)  T(F): 97 (31 Jan 2019 15:43), Max: 99 (31 Jan 2019 13:00)  HR: 108 (31 Jan 2019 17:11) (75 - 121)  BP: 145/83 (31 Jan 2019 17:11) (78/51 - 158/75)  BP(mean): 102 (31 Jan 2019 17:11) (58 - 108)  RR: 27 (31 Jan 2019 17:11) (12 - 27)  SpO2: 96% (31 Jan 2019 17:11) (95% - 99%)      PHYSICAL EXAM: Alert & Oriented X3  GENERAL: NAD, well-groomed, well-developed  HEAD:  Atraumatic, Normocephalic  EYES: EOMI, PERRLA, conjunctiva and sclera clear  NECK: Supple, No JVD, Normal thyroid  CHEST/LUNG: Clear to percussion bilaterally; No rales, rhonchi, wheezing, or rubs  HEART: Regular rate and rhythm; No murmurs, rubs, or gallops  ABDOMEN: Soft, Nontender, Nondistended; Bowel sounds present  EXTREMITIES:  2+ Peripheral Pulses, No clubbing, cyanosis, or edema    NERVOUS SYSTEM:  Cranial Nerves 2-12 intact [  x] Abnormal  [  ]  ROM: WFL all extremities [  ]  Abnormal [ x rt side   ]  Motor Strength: WFL all extremities  [  ]  Abnormal [ 2/5 rt side 4/5 lt side   ]  Sensation: intact to light touch [ x ] Abnormal [  ]  Reflexes: Symmetric [  ]  Abnormal [ x ]    FUNCTIONAL STATUS:  Bed Mobility: Independent [  ]  Supervision [  ]  Needs Assistance [ x ]  N/A [  ]  Transfers: Independent [  ]  Supervision [  ]  Needs Assistance [x  ]  N/A [  ]   Ambulation: Independent [  ]  Supervision [  ]  Needs Assistance [x  ]  N/A [  ]  ADL: Independent [  ] Requires Assistance [  ] N/A [ x ]  SEE PT/OT IE NOTES    LABS:                        13.7   16.62 )-----------( 246      ( 31 Jan 2019 06:24 )             41.1     01-31    141  |  104  |  36<H>  ----------------------------<  245<H>  4.0   |  22  |  1.5    Ca    8.8      31 Jan 2019 06:24  Phos  3.3     01-31  Mg     2.1     01-31    TPro  6.9  /  Alb  4.4  /  TBili  0.7  /  DBili  x   /  AST  16  /  ALT  25  /  AlkPhos  61  01-31    PT/INR - ( 31 Jan 2019 06:24 )   PT: 12.70 sec;   INR: 1.18 ratio         PTT - ( 31 Jan 2019 06:24 )  PTT:27.9 sec      RADIOLOGY & ADDITIONAL STUDIES:< from: CT Head No Cont (01.31.19 @ 15:38) >  No acute cranial hemorrhage. No significant changes from the prior exam.   < from: CT Angio Head w/ IV Cont (01.30.19 @ 15:15) >  CTA neck:     No significant carotid artery stenosis.       < end of copied text >  No mass effect.      < end of copied text >      Assesment:

## 2019-01-31 NOTE — PROGRESS NOTE ADULT - ASSESSMENT
IMPRESSION:  Stroke s/p TPA   hypotension     PLAN:    CNS: neruo check Q 1/2 hrs for 6 hrs and then Q 1 hrs CT scan in 24 hrs   neurology follow up   MRi when off pressors spoke to neurology     HEENT: oral care     PULMONARY: keep pox > 92%  cxr stat if any infiltarte start abx     CARDIOVASCULAR: echo now  , start plavix  24 hrs after TPA   start lipitor   keep SBP > 180    GI: GI prophylaxis.  speech and swallow eval     RENAL: follow lytes     INFECTIOUS DISEASE: no abx , pan cx for now no sign of infection     HEMATOLOGICAL:  DVT prophylaxis. sequential start heparin sq [place] afternoon for dvt prophylaxis     ENDOCRINE:  Follow up FS.  Insulin protocol if needed.  send cortisol level now   Icu monitoring

## 2019-01-31 NOTE — H&P ADULT - NSHPLABSRESULTS_GEN_ALL_CORE
Allergies    No Known Allergies    Intolerances    T(F): 98.2 (01-31-19 @ 11:04), Max: 98.4 (01-30-19 @ 15:29)  HR: 117 (01-31-19 @ 11:04) (75 - 118)  BP: 158/75 (01-31-19 @ 11:04) (78/51 - 158/75)  BP(mean): 108 (01-31-19 @ 11:04) (58 - 108)  RR: 24 (01-31-19 @ 11:04) (12 - 24)  SpO2: 95% (01-31-19 @ 11:04) (94% - 99%)    01-31    141  |  104  |  36<H>  ----------------------------<  245<H>  4.0   |  22  |  1.5    Ca    8.8      31 Jan 2019 06:24  Phos  3.3     01-31  Mg     2.1     01-31    TPro  6.9  /  Alb  4.4  /  TBili  0.7  /  DBili  x   /  AST  16  /  ALT  25  /  AlkPhos  61  01-31                            13.7   16.62 )-----------( 246      ( 31 Jan 2019 06:24 )             41.1           PT/INR - ( 31 Jan 2019 06:24 )   PT: 12.70 sec;   INR: 1.18 ratio         PTT - ( 31 Jan 2019 06:24 )  PTT:27.9 sec    Lactate Trend      CARDIAC MARKERS ( 30 Jan 2019 14:32 )  x     / 0.01 ng/mL / x     / x     / x            CAPILLARY BLOOD GLUCOSE      POCT Blood Glucose.: 228 mg/dL (31 Jan 2019 07:32)    < from: CT Head No Cont (01.30.19 @ 17:10) >    1.  No CT evidence of acute intracranial pathology.     2.  Stable short-term follow-up examination.    3.  Chronic lacunar infarcts as described above.    4.  If the patient continues to be symptomatic follow-up MRI of the brain   may be helpful for further evaluation.    < end of copied text >    < from: CT Angio Neck w/ IV Cont (01.30.19 @ 15:15) >        < end of copied text >

## 2019-01-31 NOTE — H&P ADULT - NSHPPHYSICALEXAM_GEN_ALL_CORE
PHYSICAL EXAM:  GENERAL: NAD, speaks in full sentences, no signs of respiratory distress  HEAD:  Atraumatic, Normocephalic  EYES: EOMI, PERRLA, conjunctiva and sclera clear  NECK: Supple, No JVD  CHEST/LUNG: Clear to auscultation bilaterally; No wheeze; No crackles; No accessory muscles used  HEART: Regular rate and rhythm; No murmurs;   ABDOMEN: Soft, Nontender, Nondistended; Bowel sounds present; No guarding  EXTREMITIES:   No cyanosis or edema  PSYCH: AAOx3  NEUROLOGY: 2/5 right le strength, 2/5 right ue strength.   SKIN: No rashes or lesions

## 2019-01-31 NOTE — H&P ADULT - FAMILY HISTORY
Mother  Still living? Unknown  Family history of hypertension, Age at diagnosis: Age Unknown  Family history of diabetes mellitus (DM), Age at diagnosis: Age Unknown

## 2019-01-31 NOTE — CONSULT NOTE ADULT - ASSESSMENT
IMPRESSION: Rehab of 59 y/o  m  ptn  rehab  for  CVA RT HP    PRECAUTIONS: [  ] Cardiac  [  ] Respiratory  [  ] Seizures [  ] Contact Isolation  [  ] Droplet Isolation  [ FALL ] Other    Weight Bearing Status:     RECOMMENDATION:    Out of Bed to Chair     DVT/Decubiti Prophylaxis    REHAB PLAN:     [  xx ] Bedside P/T 3-5 times a week   [ xx  ]   Bedside O/T  2-3 times a week             [   ] No Rehab Therapy Indicated                   [   ]  Speech Therapy   Conditioning/ROM                                    ADL  Bed Mobility                                               Conditioning/ROM  Transfers                                                     Bed Mobility  Sitting /Standing Balance                         Transfers                                        Gait Training                                               Sitting/Standing Balance  Stair Training [   ]Applicable                    Home equipment Eval                                                                        Splinting  [   ] Only      GOALS:   ADL   [  x ]   Independent                    Transfers  [ x  ] Independent                          Ambulation  [  x ] Independent     [   x ] With device                            [   ]  CG                                                         [   ]  CG                                                                  [   ] CG                            [    ] Min A                                                   [   ] Min A                                                              [   ] Min  A          DISCHARGE PLAN:   [  xx ]  Good candidate for Intensive Rehabilitation/Hospital based-4A [facility]                                             Will tolerate 3hrs Intensive Rehab Daily                                       [   x ]  Short Term Rehab in Skilled Nursing Facility  ptn  and  family  refused  acute rehab  ptn  wish  to  go  home  and  tart  out  ptn  pt  ot                                      [ x   ]  Home with Outpatient or VN services                                         [    ]  Possible Candidate for Intensive Hospital based Rehab

## 2019-02-01 LAB
ALBUMIN SERPL ELPH-MCNC: 4 G/DL — SIGNIFICANT CHANGE UP (ref 3.5–5.2)
ALP SERPL-CCNC: 55 U/L — SIGNIFICANT CHANGE UP (ref 30–115)
ALT FLD-CCNC: 19 U/L — SIGNIFICANT CHANGE UP (ref 0–41)
ANION GAP SERPL CALC-SCNC: 10 MMOL/L — SIGNIFICANT CHANGE UP (ref 7–14)
APPEARANCE UR: CLEAR — SIGNIFICANT CHANGE UP
AST SERPL-CCNC: 11 U/L — SIGNIFICANT CHANGE UP (ref 0–41)
BASOPHILS # BLD AUTO: 0.04 K/UL — SIGNIFICANT CHANGE UP (ref 0–0.2)
BASOPHILS NFR BLD AUTO: 0.4 % — SIGNIFICANT CHANGE UP (ref 0–1)
BILIRUB SERPL-MCNC: 0.6 MG/DL — SIGNIFICANT CHANGE UP (ref 0.2–1.2)
BILIRUB UR-MCNC: NEGATIVE — SIGNIFICANT CHANGE UP
BUN SERPL-MCNC: 13 MG/DL — SIGNIFICANT CHANGE UP (ref 10–20)
CALCIUM SERPL-MCNC: 8.7 MG/DL — SIGNIFICANT CHANGE UP (ref 8.5–10.1)
CHLORIDE SERPL-SCNC: 109 MMOL/L — SIGNIFICANT CHANGE UP (ref 98–110)
CHOLEST SERPL-MCNC: 212 MG/DL — HIGH (ref 100–200)
CO2 SERPL-SCNC: 25 MMOL/L — SIGNIFICANT CHANGE UP (ref 17–32)
COLOR SPEC: YELLOW — SIGNIFICANT CHANGE UP
CREAT SERPL-MCNC: 1.1 MG/DL — SIGNIFICANT CHANGE UP (ref 0.7–1.5)
DIFF PNL FLD: NEGATIVE — SIGNIFICANT CHANGE UP
EOSINOPHIL # BLD AUTO: 0.16 K/UL — SIGNIFICANT CHANGE UP (ref 0–0.7)
EOSINOPHIL NFR BLD AUTO: 1.4 % — SIGNIFICANT CHANGE UP (ref 0–8)
ESTIMATED AVERAGE GLUCOSE: 154 MG/DL — HIGH (ref 68–114)
GLUCOSE BLDC GLUCOMTR-MCNC: 124 MG/DL — HIGH (ref 70–99)
GLUCOSE BLDC GLUCOMTR-MCNC: 131 MG/DL — HIGH (ref 70–99)
GLUCOSE BLDC GLUCOMTR-MCNC: 149 MG/DL — HIGH (ref 70–99)
GLUCOSE BLDC GLUCOMTR-MCNC: 158 MG/DL — HIGH (ref 70–99)
GLUCOSE BLDC GLUCOMTR-MCNC: 164 MG/DL — HIGH (ref 70–99)
GLUCOSE BLDC GLUCOMTR-MCNC: 166 MG/DL — HIGH (ref 70–99)
GLUCOSE BLDC GLUCOMTR-MCNC: 169 MG/DL — HIGH (ref 70–99)
GLUCOSE BLDC GLUCOMTR-MCNC: 182 MG/DL — HIGH (ref 70–99)
GLUCOSE BLDC GLUCOMTR-MCNC: 226 MG/DL — HIGH (ref 70–99)
GLUCOSE SERPL-MCNC: 127 MG/DL — HIGH (ref 70–99)
GLUCOSE UR QL: 500 MG/DL
HBA1C BLD-MCNC: 7 % — HIGH (ref 4–5.6)
HCT VFR BLD CALC: 39.1 % — LOW (ref 42–52)
HCV AB S/CO SERPL IA: 0.12 S/CO — SIGNIFICANT CHANGE UP
HCV AB SERPL-IMP: SIGNIFICANT CHANGE UP
HDLC SERPL-MCNC: 37 MG/DL — LOW
HGB BLD-MCNC: 13 G/DL — LOW (ref 14–18)
IMM GRANULOCYTES NFR BLD AUTO: 0.4 % — HIGH (ref 0.1–0.3)
KETONES UR-MCNC: NEGATIVE — SIGNIFICANT CHANGE UP
LEUKOCYTE ESTERASE UR-ACNC: NEGATIVE — SIGNIFICANT CHANGE UP
LIPID PNL WITH DIRECT LDL SERPL: 150 MG/DL — HIGH (ref 4–129)
LYMPHOCYTES # BLD AUTO: 1.8 K/UL — SIGNIFICANT CHANGE UP (ref 1.2–3.4)
LYMPHOCYTES # BLD AUTO: 16.2 % — LOW (ref 20.5–51.1)
MAGNESIUM SERPL-MCNC: 2 MG/DL — SIGNIFICANT CHANGE UP (ref 1.8–2.4)
MCHC RBC-ENTMCNC: 28.8 PG — SIGNIFICANT CHANGE UP (ref 27–31)
MCHC RBC-ENTMCNC: 33.2 G/DL — SIGNIFICANT CHANGE UP (ref 32–37)
MCV RBC AUTO: 86.7 FL — SIGNIFICANT CHANGE UP (ref 80–94)
MONOCYTES # BLD AUTO: 0.95 K/UL — HIGH (ref 0.1–0.6)
MONOCYTES NFR BLD AUTO: 8.6 % — SIGNIFICANT CHANGE UP (ref 1.7–9.3)
NEUTROPHILS # BLD AUTO: 8.09 K/UL — HIGH (ref 1.4–6.5)
NEUTROPHILS NFR BLD AUTO: 73 % — SIGNIFICANT CHANGE UP (ref 42.2–75.2)
NITRITE UR-MCNC: NEGATIVE — SIGNIFICANT CHANGE UP
PH UR: 5.5 — SIGNIFICANT CHANGE UP (ref 5–8)
PHOSPHATE SERPL-MCNC: 2 MG/DL — LOW (ref 2.1–4.9)
PLATELET # BLD AUTO: 227 K/UL — SIGNIFICANT CHANGE UP (ref 130–400)
POTASSIUM SERPL-MCNC: 3.7 MMOL/L — SIGNIFICANT CHANGE UP (ref 3.5–5)
POTASSIUM SERPL-SCNC: 3.7 MMOL/L — SIGNIFICANT CHANGE UP (ref 3.5–5)
PROT SERPL-MCNC: 6.6 G/DL — SIGNIFICANT CHANGE UP (ref 6–8)
PROT UR-MCNC: NEGATIVE MG/DL — SIGNIFICANT CHANGE UP
RBC # BLD: 4.51 M/UL — LOW (ref 4.7–6.1)
RBC # FLD: 14.1 % — SIGNIFICANT CHANGE UP (ref 11.5–14.5)
SODIUM SERPL-SCNC: 144 MMOL/L — SIGNIFICANT CHANGE UP (ref 135–146)
SP GR SPEC: 1.01 — SIGNIFICANT CHANGE UP (ref 1.01–1.03)
TOTAL CHOLESTEROL/HDL RATIO MEASUREMENT: 5.7 RATIO — HIGH (ref 4–5.5)
TRIGL SERPL-MCNC: 196 MG/DL — HIGH (ref 10–149)
UROBILINOGEN FLD QL: 0.2 MG/DL — SIGNIFICANT CHANGE UP (ref 0.2–0.2)
WBC # BLD: 11.08 K/UL — HIGH (ref 4.8–10.8)
WBC # FLD AUTO: 11.08 K/UL — HIGH (ref 4.8–10.8)

## 2019-02-01 RX ADMIN — HEPARIN SODIUM 5000 UNIT(S): 5000 INJECTION INTRAVENOUS; SUBCUTANEOUS at 06:12

## 2019-02-01 RX ADMIN — SODIUM CHLORIDE 75 MILLILITER(S): 9 INJECTION INTRAMUSCULAR; INTRAVENOUS; SUBCUTANEOUS at 07:06

## 2019-02-01 RX ADMIN — HEPARIN SODIUM 5000 UNIT(S): 5000 INJECTION INTRAVENOUS; SUBCUTANEOUS at 13:02

## 2019-02-01 RX ADMIN — ATORVASTATIN CALCIUM 80 MILLIGRAM(S): 80 TABLET, FILM COATED ORAL at 21:05

## 2019-02-01 RX ADMIN — INSULIN HUMAN 3 UNIT(S)/HR: 100 INJECTION, SOLUTION SUBCUTANEOUS at 03:55

## 2019-02-01 RX ADMIN — HEPARIN SODIUM 5000 UNIT(S): 5000 INJECTION INTRAVENOUS; SUBCUTANEOUS at 21:05

## 2019-02-01 RX ADMIN — CLOPIDOGREL BISULFATE 75 MILLIGRAM(S): 75 TABLET, FILM COATED ORAL at 11:11

## 2019-02-01 RX ADMIN — PANTOPRAZOLE SODIUM 40 MILLIGRAM(S): 20 TABLET, DELAYED RELEASE ORAL at 06:12

## 2019-02-01 RX ADMIN — SODIUM CHLORIDE 75 MILLILITER(S): 9 INJECTION INTRAMUSCULAR; INTRAVENOUS; SUBCUTANEOUS at 03:16

## 2019-02-01 RX ADMIN — Medication 8.91 MICROGRAM(S)/KG/MIN: at 07:05

## 2019-02-01 RX ADMIN — INSULIN HUMAN 3 UNIT(S)/HR: 100 INJECTION, SOLUTION SUBCUTANEOUS at 07:05

## 2019-02-01 RX ADMIN — Medication 8.91 MICROGRAM(S)/KG/MIN: at 03:45

## 2019-02-01 NOTE — PROGRESS NOTE ADULT - ASSESSMENT
IMPRESSION:  Stroke s/p TPA   hypotension     PLAN:    CNS   neurology follow up   MRi when off pressors spoke to neurology     HEENT: oral care     PULMONARY: keep pox > 92%      CARDIOVASCULAR: echo now  , start plavix  24 hrs   start lipitor   taper off pressors       GI: GI prophylaxis. oral feed     RENAL: follow lytes     INFECTIOUS DISEASE: no abx , pan cx for now no sign of infection     HEMATOLOGICAL:  DVT prophylaxis. heparin sq     ENDOCRINE:  Follow up FS.  Insulin protocol if needed.    Icu monitoring   rehab / PT

## 2019-02-01 NOTE — OCCUPATIONAL THERAPY INITIAL EVALUATION ADULT - PLANNED THERAPY INTERVENTIONS, OT EVAL
fine motor coordination training/transfer training/neuromuscular re-education/balance training/strengthening/ADL retraining/motor coordination training

## 2019-02-01 NOTE — PHYSICAL THERAPY INITIAL EVALUATION ADULT - GENERAL OBSERVATIONS, REHAB EVAL
08:30-8:55 Chart reviewed. Pt encountered semireclined in bed,  may be seen by Physical Therapist as confirmed with Nurse. Patient denied pain at rest and ready to get up; +IV BUE; +tele

## 2019-02-01 NOTE — PHYSICAL THERAPY INITIAL EVALUATION ADULT - GAIT DEVIATIONS NOTED, PT EVAL
decreased tariq/decreased step length/decreased weight-shifting ability/stooped posture, dec heel strike/pushoff /stance francisco RLE

## 2019-02-01 NOTE — PHYSICAL THERAPY INITIAL EVALUATION ADULT - IMPAIRMENTS CONTRIBUTING TO GAIT DEVIATIONS, PT EVAL
decreased endurance/decreased strength/narrow base of support/impaired postural control/impaired balance

## 2019-02-01 NOTE — PROGRESS NOTE ADULT - ASSESSMENT
#stroke s/p tpa  - neurochecks  q4h  - c/w plavix   - 2decho  - repeat cth- negative  - goal -180 for 48 hours (finishes ~3pm today)   -MRI when more stable- still on pressors  no need for mra or carotid duplex given he has a cta   high intensity statin   LDL: 150: LDL   Hemoglobin A1C-Whole Blood: 7.2:     #hypotension likely SIRS r/o infectious etiology, clinically pt with no focal c/o  -levophed   -cxr-negative, ua-negative    f/u blood culture  -hold all antihypertensives    #dm  monitor fs  avoid hyperglycemia (worse outcomes with stroke patients)  insulin gtt for now #stroke s/p tpa  - neurochecks  q4h  - c/w plavix/asa  - 2decho  - repeat cth- negative  - goal -180 for 48 hours (finishes ~3pm today)   -MRI when more stable- still on pressors, BP now in 140's /80's  no need for mra or carotid duplex given he has a cta   high intensity statin   LDL: 150: LDL   Hemoglobin A1C-Whole Blood: 7.2:     #hypotension likely SIRS r/o infectious etiology, although no focus of any infection at all   clinically pt with no focal c/o  -levophed ....taper and d/c  -cxr-negative, ua-negative    f/u blood culture, so far negative  -hold all antihypertensives    #dm  monitor fs  avoid hyperglycemia (worse outcomes with stroke patients)  insulin gtt for now    pt does not want inpt rehab

## 2019-02-02 LAB
ALBUMIN SERPL ELPH-MCNC: 4 G/DL — SIGNIFICANT CHANGE UP (ref 3.5–5.2)
ALP SERPL-CCNC: 55 U/L — SIGNIFICANT CHANGE UP (ref 30–115)
ALT FLD-CCNC: 18 U/L — SIGNIFICANT CHANGE UP (ref 0–41)
ANION GAP SERPL CALC-SCNC: 10 MMOL/L — SIGNIFICANT CHANGE UP (ref 7–14)
AST SERPL-CCNC: 13 U/L — SIGNIFICANT CHANGE UP (ref 0–41)
BASOPHILS # BLD AUTO: 0.03 K/UL — SIGNIFICANT CHANGE UP (ref 0–0.2)
BASOPHILS NFR BLD AUTO: 0.4 % — SIGNIFICANT CHANGE UP (ref 0–1)
BILIRUB SERPL-MCNC: 0.6 MG/DL — SIGNIFICANT CHANGE UP (ref 0.2–1.2)
BUN SERPL-MCNC: 11 MG/DL — SIGNIFICANT CHANGE UP (ref 10–20)
CALCIUM SERPL-MCNC: 8.6 MG/DL — SIGNIFICANT CHANGE UP (ref 8.5–10.1)
CHLORIDE SERPL-SCNC: 104 MMOL/L — SIGNIFICANT CHANGE UP (ref 98–110)
CO2 SERPL-SCNC: 28 MMOL/L — SIGNIFICANT CHANGE UP (ref 17–32)
CREAT SERPL-MCNC: 1.1 MG/DL — SIGNIFICANT CHANGE UP (ref 0.7–1.5)
CULTURE RESULTS: SIGNIFICANT CHANGE UP
EOSINOPHIL # BLD AUTO: 0.21 K/UL — SIGNIFICANT CHANGE UP (ref 0–0.7)
EOSINOPHIL NFR BLD AUTO: 2.5 % — SIGNIFICANT CHANGE UP (ref 0–8)
GLUCOSE BLDC GLUCOMTR-MCNC: 114 MG/DL — HIGH (ref 70–99)
GLUCOSE BLDC GLUCOMTR-MCNC: 119 MG/DL — HIGH (ref 70–99)
GLUCOSE BLDC GLUCOMTR-MCNC: 147 MG/DL — HIGH (ref 70–99)
GLUCOSE BLDC GLUCOMTR-MCNC: 152 MG/DL — HIGH (ref 70–99)
GLUCOSE BLDC GLUCOMTR-MCNC: 179 MG/DL — HIGH (ref 70–99)
GLUCOSE BLDC GLUCOMTR-MCNC: 193 MG/DL — HIGH (ref 70–99)
GLUCOSE SERPL-MCNC: 121 MG/DL — HIGH (ref 70–99)
HCT VFR BLD CALC: 37.5 % — LOW (ref 42–52)
HGB BLD-MCNC: 12.5 G/DL — LOW (ref 14–18)
IMM GRANULOCYTES NFR BLD AUTO: 0.5 % — HIGH (ref 0.1–0.3)
LYMPHOCYTES # BLD AUTO: 1.61 K/UL — SIGNIFICANT CHANGE UP (ref 1.2–3.4)
LYMPHOCYTES # BLD AUTO: 19.2 % — LOW (ref 20.5–51.1)
MAGNESIUM SERPL-MCNC: 2.1 MG/DL — SIGNIFICANT CHANGE UP (ref 1.8–2.4)
MCHC RBC-ENTMCNC: 29.2 PG — SIGNIFICANT CHANGE UP (ref 27–31)
MCHC RBC-ENTMCNC: 33.3 G/DL — SIGNIFICANT CHANGE UP (ref 32–37)
MCV RBC AUTO: 87.6 FL — SIGNIFICANT CHANGE UP (ref 80–94)
MONOCYTES # BLD AUTO: 0.66 K/UL — HIGH (ref 0.1–0.6)
MONOCYTES NFR BLD AUTO: 7.9 % — SIGNIFICANT CHANGE UP (ref 1.7–9.3)
NEUTROPHILS # BLD AUTO: 5.83 K/UL — SIGNIFICANT CHANGE UP (ref 1.4–6.5)
NEUTROPHILS NFR BLD AUTO: 69.5 % — SIGNIFICANT CHANGE UP (ref 42.2–75.2)
PHOSPHATE SERPL-MCNC: 2.2 MG/DL — SIGNIFICANT CHANGE UP (ref 2.1–4.9)
PLATELET # BLD AUTO: 178 K/UL — SIGNIFICANT CHANGE UP (ref 130–400)
POTASSIUM SERPL-MCNC: 4.1 MMOL/L — SIGNIFICANT CHANGE UP (ref 3.5–5)
POTASSIUM SERPL-SCNC: 4.1 MMOL/L — SIGNIFICANT CHANGE UP (ref 3.5–5)
PROT SERPL-MCNC: 6.3 G/DL — SIGNIFICANT CHANGE UP (ref 6–8)
RBC # BLD: 4.28 M/UL — LOW (ref 4.7–6.1)
RBC # FLD: 14.4 % — SIGNIFICANT CHANGE UP (ref 11.5–14.5)
SODIUM SERPL-SCNC: 142 MMOL/L — SIGNIFICANT CHANGE UP (ref 135–146)
SPECIMEN SOURCE: SIGNIFICANT CHANGE UP
WBC # BLD: 8.38 K/UL — SIGNIFICANT CHANGE UP (ref 4.8–10.8)
WBC # FLD AUTO: 8.38 K/UL — SIGNIFICANT CHANGE UP (ref 4.8–10.8)

## 2019-02-02 RX ORDER — DEXTROSE 50 % IN WATER 50 %
25 SYRINGE (ML) INTRAVENOUS ONCE
Qty: 0 | Refills: 0 | Status: DISCONTINUED | OUTPATIENT
Start: 2019-02-02 | End: 2019-02-03

## 2019-02-02 RX ORDER — INSULIN GLARGINE 100 [IU]/ML
15 INJECTION, SOLUTION SUBCUTANEOUS AT BEDTIME
Qty: 0 | Refills: 0 | Status: DISCONTINUED | OUTPATIENT
Start: 2019-02-02 | End: 2019-02-03

## 2019-02-02 RX ORDER — INSULIN LISPRO 100/ML
5 VIAL (ML) SUBCUTANEOUS
Qty: 0 | Refills: 0 | Status: DISCONTINUED | OUTPATIENT
Start: 2019-02-02 | End: 2019-02-03

## 2019-02-02 RX ORDER — DEXTROSE 50 % IN WATER 50 %
15 SYRINGE (ML) INTRAVENOUS ONCE
Qty: 0 | Refills: 0 | Status: DISCONTINUED | OUTPATIENT
Start: 2019-02-02 | End: 2019-02-03

## 2019-02-02 RX ORDER — DEXTROSE 50 % IN WATER 50 %
12.5 SYRINGE (ML) INTRAVENOUS ONCE
Qty: 0 | Refills: 0 | Status: DISCONTINUED | OUTPATIENT
Start: 2019-02-02 | End: 2019-02-03

## 2019-02-02 RX ORDER — SODIUM CHLORIDE 9 MG/ML
1000 INJECTION, SOLUTION INTRAVENOUS
Qty: 0 | Refills: 0 | Status: DISCONTINUED | OUTPATIENT
Start: 2019-02-02 | End: 2019-02-03

## 2019-02-02 RX ORDER — GLUCAGON INJECTION, SOLUTION 0.5 MG/.1ML
1 INJECTION, SOLUTION SUBCUTANEOUS ONCE
Qty: 0 | Refills: 0 | Status: DISCONTINUED | OUTPATIENT
Start: 2019-02-02 | End: 2019-02-03

## 2019-02-02 RX ADMIN — HEPARIN SODIUM 5000 UNIT(S): 5000 INJECTION INTRAVENOUS; SUBCUTANEOUS at 13:58

## 2019-02-02 RX ADMIN — Medication 5 UNIT(S): at 12:00

## 2019-02-02 RX ADMIN — ATORVASTATIN CALCIUM 80 MILLIGRAM(S): 80 TABLET, FILM COATED ORAL at 22:00

## 2019-02-02 RX ADMIN — HEPARIN SODIUM 5000 UNIT(S): 5000 INJECTION INTRAVENOUS; SUBCUTANEOUS at 22:00

## 2019-02-02 RX ADMIN — PANTOPRAZOLE SODIUM 40 MILLIGRAM(S): 20 TABLET, DELAYED RELEASE ORAL at 08:23

## 2019-02-02 RX ADMIN — CLOPIDOGREL BISULFATE 75 MILLIGRAM(S): 75 TABLET, FILM COATED ORAL at 12:01

## 2019-02-02 RX ADMIN — INSULIN GLARGINE 15 UNIT(S): 100 INJECTION, SOLUTION SUBCUTANEOUS at 22:00

## 2019-02-02 RX ADMIN — HEPARIN SODIUM 5000 UNIT(S): 5000 INJECTION INTRAVENOUS; SUBCUTANEOUS at 06:04

## 2019-02-02 RX ADMIN — Medication 5 UNIT(S): at 17:16

## 2019-02-03 ENCOUNTER — TRANSCRIPTION ENCOUNTER (OUTPATIENT)
Age: 59
End: 2019-02-03

## 2019-02-03 VITALS — SYSTOLIC BLOOD PRESSURE: 167 MMHG | DIASTOLIC BLOOD PRESSURE: 88 MMHG | OXYGEN SATURATION: 100 % | HEART RATE: 89 BPM

## 2019-02-03 LAB
GLUCOSE BLDC GLUCOMTR-MCNC: 143 MG/DL — HIGH (ref 70–99)
GLUCOSE BLDC GLUCOMTR-MCNC: 174 MG/DL — HIGH (ref 70–99)
GLUCOSE BLDC GLUCOMTR-MCNC: 187 MG/DL — HIGH (ref 70–99)

## 2019-02-03 RX ORDER — ROSUVASTATIN CALCIUM 5 MG/1
0 TABLET ORAL
Qty: 0 | Refills: 0 | COMMUNITY

## 2019-02-03 RX ORDER — ATORVASTATIN CALCIUM 80 MG/1
1 TABLET, FILM COATED ORAL
Qty: 30 | Refills: 2
Start: 2019-02-03

## 2019-02-03 RX ORDER — AMLODIPINE BESYLATE 2.5 MG/1
1 TABLET ORAL
Qty: 0 | Refills: 0 | COMMUNITY

## 2019-02-03 RX ORDER — CLOPIDOGREL BISULFATE 75 MG/1
1 TABLET, FILM COATED ORAL
Qty: 30 | Refills: 2 | OUTPATIENT
Start: 2019-02-03 | End: 2019-05-03

## 2019-02-03 RX ORDER — ASPIRIN/CALCIUM CARB/MAGNESIUM 324 MG
1 TABLET ORAL
Qty: 0 | Refills: 0 | COMMUNITY

## 2019-02-03 RX ORDER — LISINOPRIL 2.5 MG/1
40 TABLET ORAL ONCE
Qty: 0 | Refills: 0 | Status: COMPLETED | OUTPATIENT
Start: 2019-02-03 | End: 2019-02-03

## 2019-02-03 RX ORDER — TRIAMTERENE/HYDROCHLOROTHIAZID 75 MG-50MG
1 TABLET ORAL
Qty: 0 | Refills: 0 | COMMUNITY

## 2019-02-03 RX ADMIN — Medication 5 UNIT(S): at 07:49

## 2019-02-03 RX ADMIN — HEPARIN SODIUM 5000 UNIT(S): 5000 INJECTION INTRAVENOUS; SUBCUTANEOUS at 13:51

## 2019-02-03 RX ADMIN — HEPARIN SODIUM 5000 UNIT(S): 5000 INJECTION INTRAVENOUS; SUBCUTANEOUS at 05:34

## 2019-02-03 RX ADMIN — PANTOPRAZOLE SODIUM 40 MILLIGRAM(S): 20 TABLET, DELAYED RELEASE ORAL at 07:49

## 2019-02-03 RX ADMIN — LISINOPRIL 40 MILLIGRAM(S): 2.5 TABLET ORAL at 16:50

## 2019-02-03 RX ADMIN — CLOPIDOGREL BISULFATE 75 MILLIGRAM(S): 75 TABLET, FILM COATED ORAL at 11:37

## 2019-02-03 NOTE — DISCHARGE NOTE ADULT - CARE PLAN
Principal Discharge DX:	CVA (cerebral vascular accident)  Goal:	resolution of symptoms  Assessment and plan of treatment:	take medications as prescribed. follow up with your pmd and neurology  take your bp in the morning and at night, keep a log and when you see dr silveira show him the log to determine the BP meds needed Principal Discharge DX:	CVA (cerebral vascular accident)  Goal:	resolution of symptoms  Assessment and plan of treatment:	take medications as prescribed. follow up with your pmd and neurology  take your bp in the morning and at night, keep a log and when you see dr wells show him the log to determine the BP meds needed and at what time of the day. also follow up with Dr. Wells regarding hypercoagulable workup to be done as outpatient.

## 2019-02-03 NOTE — DISCHARGE NOTE ADULT - MEDICATION SUMMARY - MEDICATIONS TO TAKE
I will START or STAY ON the medications listed below when I get home from the hospital:    lisinopril 40 mg oral tablet  -- 1 tab(s) by mouth once a day  -- Indication: For HTN (hypertension)    tamsulosin 0.4 mg oral capsule  -- 1 cap(s) by mouth once a day  -- Indication: For bph    Janumet 50 mg-500 mg oral tablet  -- 1 tab(s) by mouth 2 times a day  -- Indication: For Diabetes    atorvastatin 80 mg oral tablet  -- 1 tab(s) by mouth once a day (at bedtime)  -- Indication: For CVA (cerebral vascular accident)    clopidogrel 75 mg oral tablet  -- 1 tab(s) by mouth once a day  -- Indication: For CVA (cerebral vascular accident)

## 2019-02-03 NOTE — DISCHARGE NOTE ADULT - PATIENT PORTAL LINK FT
You can access the [email or web address] Patient Portal, offered by [facility], by registering with the following website: [email or web address]

## 2019-02-03 NOTE — DISCHARGE NOTE ADULT - HOSPITAL COURSE
59 yo male with PMH of CVA 3 yrs ago no residual deficits on aspirin and statin, DLD, HTN p/w expressive aphasia and right sided hemiparesis. presented 2 hours after the onset of symptoms. Was given TPA in the ED, with mild improvement in his weakness, resolution of his le numbness, and resolution of his aphasia. his hospital course was complicated by a bought of SIRS requiring levophed and was placed on an insulin drip to tightly regulate his sugars given that hyperglycemia can worsen stroke symptoms. Patients improved day by day and currently only has some right le weakness. His other stroke symptoms have resolved. he is going home with the plan of outpatient rehabilitation.   patient will have his lisinopril restarted but will hold his amlodipine and triamterene-hctz. his bp currently will not tolerate 3 antihypertensive. patient educated about the importance of bp and diabetes control. Has been instructed to keep a log of his BP in the morning and at night, to show the log to Dr. Wells (PMD) to determine what medications should be added back and at what time of the day to give the meds.    MRI done does not show any acute infarct.

## 2019-02-03 NOTE — PROGRESS NOTE ADULT - PROVIDER SPECIALTY LIST ADULT
Internal Medicine
Neurology
Neurology
Pulmonology
Neurology
Neurology
Internal Medicine

## 2019-02-03 NOTE — DISCHARGE NOTE ADULT - CARE PROVIDERS DIRECT ADDRESSES
,DirectAddress_Unknown,[email or web address]

## 2019-02-03 NOTE — PROGRESS NOTE ADULT - SUBJECTIVE AND OBJECTIVE BOX
Name: BENTON AHMADI  Age: 58y  Gender: Male    Pt was seen and examined.   doing much better    Allergies:  No Known Allergies      PHYSICAL EXAM:    Vitals:  T(C): 35.9 (02-02-19 @ 23:11), Max: 36.5 (02-02-19 @ 11:00)  HR: 84 (02-02-19 @ 23:11) (78 - 97)  BP: 142/92 (02-02-19 @ 23:11) (121/79 - 157/95)  RR: 20 (02-02-19 @ 23:11) (18 - 20)  SpO2: --  Wt(kg): --Vital Signs Last 24 Hrs  T(C): 35.9 (02 Feb 2019 23:11), Max: 36.5 (02 Feb 2019 11:00)  T(F): 96.7 (02 Feb 2019 23:11), Max: 97.7 (02 Feb 2019 11:00)  HR: 84 (02 Feb 2019 23:11) (78 - 97)  BP: 142/92 (02 Feb 2019 23:11) (121/79 - 157/95)  BP(mean): 111 (02 Feb 2019 23:11) (91 - 120)  RR: 20 (02 Feb 2019 23:11) (18 - 20)  SpO2: --      NECK: Supple, No JVD  CHEST/LUNG: CTA, B/L, No rales, rhonchi, wheezing  HEART: S1,S2, N1 Regular rate and rhythm; No murmurs, rubs, or gallops  ABDOMEN: Soft, Nontender, Nondistended; Bowel sounds present  EXTREMITIES:  2+ Peripheral Pulses, No clubbing, cyanosis, or edema  Neuro - +residual deficit in RLE, RUE now much better 4/5    LABS:                        12.5   8.38  )-----------( 178      ( 02 Feb 2019 06:44 )             37.5     02-02    142  |  104  |  11  ----------------------------<  121<H>  4.1   |  28  |  1.1    Ca    8.6      02 Feb 2019 06:44  Phos  2.2     02-02  Mg     2.1     02-02    TPro  6.3  /  Alb  4.0  /  TBili  0.6  /  DBili  x   /  AST  13  /  ALT  18  /  AlkPhos  55  02-02    LIVER FUNCTIONS - ( 02 Feb 2019 06:44 )  Alb: 4.0 g/dL / Pro: 6.3 g/dL / ALK PHOS: 55 U/L / ALT: 18 U/L / AST: 13 U/L / GGT: x                 MEDICATIONS  (STANDING):  atorvastatin 80 milliGRAM(s) Oral at bedtime  clopidogrel Tablet 75 milliGRAM(s) Oral daily  dextrose 5%. 1000 milliLiter(s) (50 mL/Hr) IV Continuous <Continuous>  dextrose 5%. 1000 milliLiter(s) (50 mL/Hr) IV Continuous <Continuous>  dextrose 50% Injectable 12.5 Gram(s) IV Push once  dextrose 50% Injectable 25 Gram(s) IV Push once  dextrose 50% Injectable 25 Gram(s) IV Push once  dextrose 50% Injectable 12.5 Gram(s) IV Push once  dextrose 50% Injectable 25 Gram(s) IV Push once  dextrose 50% Injectable 25 Gram(s) IV Push once  heparin  Injectable 5000 Unit(s) SubCutaneous every 8 hours  insulin glargine Injectable (LANTUS) 15 Unit(s) SubCutaneous at bedtime  insulin lispro Injectable (HumaLOG) 5 Unit(s) SubCutaneous three times a day before meals  norepinephrine Infusion 0.05 MICROgram(s)/kG/Min (8.906 mL/Hr) IV Continuous <Continuous>  pantoprazole    Tablet 40 milliGRAM(s) Oral before breakfast        RADIOLOGY & ADDITIONAL TESTS:    Imaging Personally Reviewed:  [ ] YES  [ ] NO    A/P:  Assessment and Plan:   · Assessment		  #stroke s/p tpa  - neurochecks  q4h  - c/w plavix/asa  - 2decho  hypercoagulable w/u reasonable  - repeat cth- negative  - goal -180 for 48 hours (finishes ~3pm today)   -MRI in am  no need for mra or carotid duplex given he has a cta   high intensity statin   LDL: 150: LDL   Hemoglobin A1C-Whole Blood: 7.2:     #hypotension likely SIRS r/o infectious etiology, although no focus of any infection at all   clinically pt with no focal c/o, afebrile  -levophed ....taper and d/c  -cxr-negative, ua-negative    f/u blood culture, so far negative  -hold all antihypertensives    #dm  monitor fs  avoid hyperglycemia (worse outcomes with stroke patients)  insulin gtt for now    pt does not want inpt rehab
Neurology Follow up note    Name  BENTON AHMADI    HPI:  57 yo male with PMH of CVA 3 yrs ago no residual deficits on aspirin and statin, DLD, HTN p/w expressive aphasia and right sided hemiparesis. presented 2 hours after the onset of symptoms. Was given TPA in the ED, with mild improvement in his weakness, resolution of his le numbness, and resolution of his aphasia    NIHS at presentation 9 (31 Jan 2019 11:20)    NEURO:  PATIENT IS IMPROVED NEUROLOGICALLY FROM ADM  SOME TRACE MOVEMENT ON THE RIGHT  NO APHASIA  HAD A HA LAST NIGHT, CTH WAS REPEATED. NO BLEED  PATIENT ON PRESSORS OVERNIGHT. UNCLEAR ETIOLOGY OF HYPOTENSION  WBC DID INCREASE FROM 8-16    Vital Signs Last 24 Hrs  T(C): 36.8 (31 Jan 2019 11:04), Max: 36.9 (30 Jan 2019 15:29)  T(F): 98.2 (31 Jan 2019 11:04), Max: 98.4 (30 Jan 2019 15:29)  HR: 117 (31 Jan 2019 11:04) (75 - 118)  BP: 158/75 (31 Jan 2019 11:04) (78/51 - 158/75)  BP(mean): 108 (31 Jan 2019 11:04) (58 - 108)  RR: 24 (31 Jan 2019 11:04) (12 - 24)  SpO2: 95% (31 Jan 2019 11:04) (94% - 99%)    Neurological Exam:   Mental status: Awake, alert and oriented x3.  Recent and remote memory intact.  Naming, repetition and comprehension intact.  Attention/concentration intact.  No dysarthria, no aphasia.  Fund of knowledge appropriate.    Cranial nerves: Pupils equally round and reactive to light, visual fields full, no nystagmus, extraocular muscles intact, V1 through V3 intact bilaterally and symmetric, RIGHT CENTRL FACIAL, hearing intact to finger rub, palate elevation symmetric, tongue was midline.  Motor: FULL ON LEFT, 2/5 PROXIMALLY ON RIGHT    Sensation: Intact to light touch, proprioception, and pinprick.   Coordination: No dysmetria on finger-to-nose and heel-to-shin.  No dysdiadokinesia.    Medications  atorvastatin 80 milliGRAM(s) Oral at bedtime  dextrose 40% Gel 15 Gram(s) Oral once PRN  dextrose 5%. 1000 milliLiter(s) IV Continuous <Continuous>  dextrose 50% Injectable 12.5 Gram(s) IV Push once  dextrose 50% Injectable 25 Gram(s) IV Push once  dextrose 50% Injectable 25 Gram(s) IV Push once  glucagon  Injectable 1 milliGRAM(s) IntraMuscular once PRN  insulin Infusion 3 Unit(s)/Hr IV Continuous <Continuous>  insulin lispro Injectable (HumaLOG) 3 Unit(s) SubCutaneous before dinner  norepinephrine Infusion 0.05 MICROgram(s)/kG/Min IV Continuous <Continuous>  pantoprazole    Tablet 40 milliGRAM(s) Oral before breakfast  sodium chloride 0.9%. 1000 milliLiter(s) IV Continuous <Continuous>      Lab  01-31    141  |  104  |  36<H>  ----------------------------<  245<H>  4.0   |  22  |  1.5    Ca    8.8      31 Jan 2019 06:24  Phos  3.3     01-31  Mg     2.1     01-31    TPro  6.9  /  Alb  4.4  /  TBili  0.7  /  DBili  x   /  AST  16  /  ALT  25  /  AlkPhos  61  01-31                          13.7   16.62 )-----------( 246      ( 31 Jan 2019 06:24 )             41.1     LIVER FUNCTIONS - ( 31 Jan 2019 06:24 )  Alb: 4.4 g/dL / Pro: 6.9 g/dL / ALK PHOS: 61 U/L / ALT: 25 U/L / AST: 16 U/L / GGT: x             2.1        Radiology  CT Head No Cont:   EXAM:  CT BRAIN            PROCEDURE DATE:  01/30/2019            INTERPRETATION:  CLINICAL HISTORY / REASON FOR EXAM: Headache.    TECHNIQUE: Multiple axial CT images of the head were obtained from the   base of the skull to the vertex without theadministration of IV   contrast. Sagittal and coronal reformats were submitted.    COMPARISON: CT head without contrast from 1/30/2019 at 1:48 PM.      FINDINGS:    Contrast from recent CTA noted within the venous system.    The ventricles and cortical sulci are within normal limits for the   patient's stated age.    There is no evidence of acute intracranial hemorrhage, mass effect or   midline shift.    Rounded hypodensities within the bilateral basal ganglia and right   thalamus, consistent with chronic lacunar infarcts.    Gray-white differentiation is otherwise grossly well maintained.    The bones are intact. Mastoid air cells are well aerated bilaterally.   Polypoid mucosal thickening of the bilateral maxillary sinuses. The   remainderof the visualized portions of the paranasal sinuses are   unremarkable.    Beam hardening artifact is noted overlying the brain stem and posterior   fossa which is inherent to CT in this location.      IMPRESSION:     1.  No CT evidence of acute intracranial pathology.     2.  Stable short-term follow-up examination.    3.  Chronic lacunar infarcts as described above.    4.  If the patient continues to be symptomatic follow-up MRI of the brain   may be helpful for further evaluation.            Assessment: ACUTE CVA. LIKELY L MCA TERRITORY    Plan:  CONT POST TPA PROTOCOL AS PER STROKE CODE NOTE  AFTER 24 HRS, REPEAT CTH. IF NO BLEED CAN START PLAVIX 75 MG QD (ASA FAILURE)  CONT LIPITOR 80 MG  HBA1C AND LIPID PANEL  2D ECHO  AFTER 24 HRS, Q 4 NEURO CHECKS  MRI BRAIN NC WHEN STABLE HEMODYNAMICALLY  PLEASE CALL WITH ANY QUESTIONS
Neurology Follow up note    Name  BENTON AHMADI    HPI:  57 yo male with PMH of CVA 3 yrs ago no residual deficits on aspirin and statin, DLD, HTN p/w expressive aphasia and right sided hemiparesis. presented 2 hours after the onset of symptoms. Was given TPA in the ED, with mild improvement in his weakness, resolution of his le numbness, and resolution of his aphasia    NIHS at presentation 9 (31 Jan 2019 11:20)      Interval History - Patient seen and examined with no new complaints.  He is now off pressors and will be monitored for 12 hours to ensure his BP is stable prior to being transported for MRI brain w/o DEEDEE.          Vital Signs Last 24 Hrs  T(C): 36.5 (02 Feb 2019 11:00), Max: 36.5 (01 Feb 2019 23:03)  T(F): 97.7 (02 Feb 2019 11:00), Max: 97.7 (01 Feb 2019 23:03)  HR: 85 (02 Feb 2019 13:45) (75 - 97)  BP: 137/85 (02 Feb 2019 13:45) (121/79 - 180/89)  BP(mean): 107 (02 Feb 2019 13:45) (91 - 126)  RR: 20 (02 Feb 2019 11:00) (18 - 20)  SpO2: 97% (01 Feb 2019 19:20) (97% - 97%)  ICU Vital Signs Last 24 Hrs  T(C): 36.5 (02 Feb 2019 11:00), Max: 36.5 (01 Feb 2019 23:03)  T(F): 97.7 (02 Feb 2019 11:00), Max: 97.7 (01 Feb 2019 23:03)  HR: 85 (02 Feb 2019 13:45) (75 - 97)  BP: 137/85 (02 Feb 2019 13:45) (121/79 - 180/89)  BP(mean): 107 (02 Feb 2019 13:45) (91 - 126)  ABP: --  ABP(mean): --  RR: 20 (02 Feb 2019 11:00) (18 - 20)  SpO2: 97% (01 Feb 2019 19:20) (97% - 97%)          Neurological Exam:   A+Ox3 language and attention normal  CN 2-12 normal  No drift Power in UE 5/5  RLE 4/5  LLE 5/5  FTN NL    NIHSS 2  mrankin 2      Medications  acetaminophen   Tablet .. 650 milliGRAM(s) Oral every 6 hours PRN  atorvastatin 80 milliGRAM(s) Oral at bedtime  clopidogrel Tablet 75 milliGRAM(s) Oral daily  dextrose 40% Gel 15 Gram(s) Oral once PRN  dextrose 40% Gel 15 Gram(s) Oral once PRN  dextrose 5%. 1000 milliLiter(s) IV Continuous <Continuous>  dextrose 5%. 1000 milliLiter(s) IV Continuous <Continuous>  dextrose 50% Injectable 12.5 Gram(s) IV Push once  dextrose 50% Injectable 25 Gram(s) IV Push once  dextrose 50% Injectable 25 Gram(s) IV Push once  dextrose 50% Injectable 12.5 Gram(s) IV Push once  dextrose 50% Injectable 25 Gram(s) IV Push once  dextrose 50% Injectable 25 Gram(s) IV Push once  glucagon  Injectable 1 milliGRAM(s) IntraMuscular once PRN  glucagon  Injectable 1 milliGRAM(s) IntraMuscular once PRN  heparin  Injectable 5000 Unit(s) SubCutaneous every 8 hours  insulin glargine Injectable (LANTUS) 15 Unit(s) SubCutaneous at bedtime  insulin lispro Injectable (HumaLOG) 5 Unit(s) SubCutaneous three times a day before meals  norepinephrine Infusion 0.05 MICROgram(s)/kG/Min IV Continuous <Continuous>  pantoprazole    Tablet 40 milliGRAM(s) Oral before breakfast      Lab                        12.5   8.38  )-----------( 178      ( 02 Feb 2019 06:44 )             37.5     02-02    142  |  104  |  11  ----------------------------<  121<H>  4.1   |  28  |  1.1    Ca    8.6      02 Feb 2019 06:44  Phos  2.2     02-02  Mg     2.1     02-02    TPro  6.3  /  Alb  4.0  /  TBili  0.6  /  DBili  x   /  AST  13  /  ALT  18  /  AlkPhos  55  02-02    CAPILLARY BLOOD GLUCOSE      POCT Blood Glucose.: 179 mg/dL (02 Feb 2019 11:28)  POCT Blood Glucose.: 193 mg/dL (02 Feb 2019 08:20)  POCT Blood Glucose.: 114 mg/dL (02 Feb 2019 06:06)  POCT Blood Glucose.: 147 mg/dL (02 Feb 2019 02:02)  POCT Blood Glucose.: 131 mg/dL (01 Feb 2019 22:03)  POCT Blood Glucose.: 164 mg/dL (01 Feb 2019 18:16)    LIVER FUNCTIONS - ( 02 Feb 2019 06:44 )  Alb: 4.0 g/dL / Pro: 6.3 g/dL / ALK PHOS: 55 U/L / ALT: 18 U/L / AST: 13 U/L / GGT: x               Radiology  < from: CT Head No Cont (01.31.19 @ 15:38) >    EXAM:  CT BRAIN            PROCEDURE DATE:  01/31/2019            INTERPRETATION:  Clinical History / Reason for exam: Post TPA. Stroke.    Technique: Noncontrast head CT.  Contiguous unenhanced CT axial images of   the head from the base to the vertex.    Comparison:  Head CT 1/30/2018.    Findings/  impression:    No acute cranial hemorrhage. No significant changes from the prior exam.   No mass effect.          < end of copied text >      < from: CT Angio Neck w/ IV Cont (01.30.19 @ 15:15) >  Impression:     CTA neck:     No significant carotid artery stenosis.     Moderate focal stenosis of the distal V4 segment of the right vertebral   artery, appears slightly improved from the prior exam.     CTA head: No vascular filling defect to suggest thromboembolus.     < end of copied text >        Assessment- Patient with history of multiple prior lacunar infarcts presents with new right hemiparesis s/p TPA.  Patients prior strokes appear to be related to small vessel disease.  Will have better impression after MRI whether current infarct also suggests small vessel disease    Plan  1. MRI brain w/o DEEDEE tomorrow  2. ECHO with bubble   3. Depending on MRI brain may need hypercoagulablity work up (can be followed as out patient) and event monitor  4. If stable for next 24 hours can anticipate dc tomorrow afternoon or monday morning.  5. Call STAT for any changes
Neurology Follow up note    Name  BENTON AHMADI    HPI:  59 yo male with PMH of CVA 3 yrs ago no residual deficits on aspirin and statin, DLD, HTN p/w expressive aphasia and right sided hemiparesis. presented 2 hours after the onset of symptoms. Was given TPA in the ED, with mild improvement in his weakness, resolution of his le numbness, and resolution of his aphasia    NIHS at presentation 9 (31 Jan 2019 11:20)      Interval History - Patient c/o slight increase in weakness in RLE.  Denies pain in back, neck or headache.  No paresthesias. No cardiac events          Vital Signs Last 24 Hrs  T(C): 36.2 (03 Feb 2019 11:00), Max: 36.4 (03 Feb 2019 07:01)  T(F): 97.2 (03 Feb 2019 11:00), Max: 97.5 (03 Feb 2019 07:01)  HR: 85 (03 Feb 2019 07:11) (81 - 98)  BP: 140/91 (03 Feb 2019 07:11) (133/85 - 160/91)  BP(mean): 108 (03 Feb 2019 07:11) (102 - 117)  RR: 18 (03 Feb 2019 11:00) (18 - 20)  SpO2: --  ICU Vital Signs Last 24 Hrs  T(C): 36.2 (03 Feb 2019 11:00), Max: 36.4 (03 Feb 2019 07:01)  T(F): 97.2 (03 Feb 2019 11:00), Max: 97.5 (03 Feb 2019 07:01)  HR: 85 (03 Feb 2019 07:11) (81 - 98)  BP: 140/91 (03 Feb 2019 07:11) (133/85 - 160/91)  BP(mean): 108 (03 Feb 2019 07:11) (102 - 117)  ABP: --  ABP(mean): --  RR: 18 (03 Feb 2019 11:00) (18 - 20)  SpO2: --          Neurological Exam:   Mental status: Awake, alert and oriented x3.  Recent and remote memory intact.  Naming, repetition and comprehension intact.  Attention/concentration intact.  No dysarthria, no aphasia.  Fund of knowledge appropriate.    Cranial nerves: pupils equally round and reactive to light, visual fields full, no nystagmus, extraocular muscles intact, V1 through V3 intact bilaterally and symmetric, face symmetric, hearing intact to finger rub, palate elevation symmetric, tongue was midline, sternocleidomastoid/shoulder shrug strength bilaterally 5/5.    Motor:  Normal bulk and tone, strength 5/5 in bilateral upper and lower extremities.  except in right HF 4+/5  Sensation: Intact to light touch, proprioception, and pinprick.  No neglect.   Coordination: No dysmetria on finger-to-nose and heel-to-shin.  No clumsiness.  Reflexes: 1+ in upper and lower extremities, downgoing toes bilaterally  NIHSS 1          Medications  acetaminophen   Tablet .. 650 milliGRAM(s) Oral every 6 hours PRN  atorvastatin 80 milliGRAM(s) Oral at bedtime  clopidogrel Tablet 75 milliGRAM(s) Oral daily  dextrose 40% Gel 15 Gram(s) Oral once PRN  dextrose 40% Gel 15 Gram(s) Oral once PRN  dextrose 5%. 1000 milliLiter(s) IV Continuous <Continuous>  dextrose 5%. 1000 milliLiter(s) IV Continuous <Continuous>  dextrose 50% Injectable 12.5 Gram(s) IV Push once  dextrose 50% Injectable 25 Gram(s) IV Push once  dextrose 50% Injectable 25 Gram(s) IV Push once  dextrose 50% Injectable 12.5 Gram(s) IV Push once  dextrose 50% Injectable 25 Gram(s) IV Push once  dextrose 50% Injectable 25 Gram(s) IV Push once  glucagon  Injectable 1 milliGRAM(s) IntraMuscular once PRN  glucagon  Injectable 1 milliGRAM(s) IntraMuscular once PRN  heparin  Injectable 5000 Unit(s) SubCutaneous every 8 hours  insulin glargine Injectable (LANTUS) 15 Unit(s) SubCutaneous at bedtime  insulin lispro Injectable (HumaLOG) 5 Unit(s) SubCutaneous three times a day before meals  norepinephrine Infusion 0.05 MICROgram(s)/kG/Min IV Continuous <Continuous>  pantoprazole    Tablet 40 milliGRAM(s) Oral before breakfast      Lab                        12.5   8.38  )-----------( 178      ( 02 Feb 2019 06:44 )             37.5     02-02    142  |  104  |  11  ----------------------------<  121<H>  4.1   |  28  |  1.1    Ca    8.6      02 Feb 2019 06:44  Phos  2.2     02-02  Mg     2.1     02-02    TPro  6.3  /  Alb  4.0  /  TBili  0.6  /  DBili  x   /  AST  13  /  ALT  18  /  AlkPhos  55  02-02    CAPILLARY BLOOD GLUCOSE      POCT Blood Glucose.: 187 mg/dL (03 Feb 2019 11:21)  POCT Blood Glucose.: 143 mg/dL (03 Feb 2019 07:47)  POCT Blood Glucose.: 152 mg/dL (02 Feb 2019 20:45)  POCT Blood Glucose.: 119 mg/dL (02 Feb 2019 16:50)    LIVER FUNCTIONS - ( 02 Feb 2019 06:44 )  Alb: 4.0 g/dL / Pro: 6.3 g/dL / ALK PHOS: 55 U/L / ALT: 18 U/L / AST: 13 U/L / GGT: x               Radiology  < from: MR Head No Cont (02.03.19 @ 12:53) >    EXAM:  MR BRAIN            PROCEDURE DATE:  02/03/2019            INTERPRETATION:  Clinical history / Reason for exam: Stroke. TECHNIQUE:   Multiplanar multi sequential MRI of the brain was performed without   contrast.    COMPARISON: Head CT 1/31/2018. MRI brain 8/26/2016.    FINDINGS:    The ventricles and cortical sulci are appropriate for this patients   stated age.    Chronic lacunar infarctions are noted in the bilateral basal ganglia and   left frontal lobe periventricular white matter, unchanged. Scattered foci   of increased T2 signal are seen in the periventricular and deep white   matter which are nonspecific, however, most compatible with chronic   microvascular ischemic changes. There is no restricted diffusion. The   cerebellum and brainstem are unremarkable in appearance.      There is normal flow void present within the major vascular structures.      The sella and parasellar structures are intact. The structures at the   foramen magnum are unremarkable.    The orbits, sinuses, and mastoid complexes are unremarkable.     There is normal marrow signal seen within the clivus.      IMPRESSION:    No acute infarction.    Chronic lacunar infarctions and mild chronic microvascular ischemic   changes.    < end of copied text >      Assessment-Patient presenting with acute right hemiparesis given TPA in ED.  Had significant improvement but still has some right leg weakness.  MRI negative for acute changes.  Possibly small brainstem infarct not seen on MRI vs. Peripheral nerve disease.    Plan  1. Continue plavix and statin  2. OK to DC  3. Out patient PT/OT  4. Follow up with Dr. Alonso and will likely need an EMG/NCS as outpatient  5. NIHSS 1, mrankin 1
Patient is a 58y old  Male who presents with a chief complaint of Stroke (30 Jan 2019 17:18)      Over Night Events:  Patient seen and examined on levop to keep BP around 160 as per neurology     ROS:  See HPI    PHYSICAL EXAM    ICU Vital Signs Last 24 Hrs  T(C): 36.2 (31 Jan 2019 07:03), Max: 36.9 (30 Jan 2019 15:29)  T(F): 97.1 (31 Jan 2019 07:03), Max: 98.4 (30 Jan 2019 15:29)  HR: 118 (31 Jan 2019 09:04) (75 - 118)  BP: 154/68 (31 Jan 2019 09:04) (78/51 - 154/68)  BP(mean): 102 (31 Jan 2019 09:04) (58 - 102)  ABP: --  ABP(mean): --  RR: 23 (31 Jan 2019 09:04) (12 - 23)  SpO2: 97% (31 Jan 2019 09:04) (94% - 99%)      General: awake follow command   HEENT:            anjel    Lymph Nodes: NO cervical LN   Lungs: Bilateral BS  Cardiovascular: Regular   Abdomen: Soft, Positive BS  Extremities: No clubbing   Skin: warm   Neurological:  1-2/5 right ext   Musculoskeletal: move all ext     I&O's Detail    30 Jan 2019 07:01  -  31 Jan 2019 07:00  --------------------------------------------------------  IN:    norepinephrine Infusion: 195 mL    Sodium Chloride 0.9% IV Bolus: 2000 mL    sodium chloride 0.9%.: 150 mL  Total IN: 2345 mL    OUT:    Voided: 1800 mL  Total OUT: 1800 mL    Total NET: 545 mL      31 Jan 2019 07:01  -  31 Jan 2019 10:14  --------------------------------------------------------  IN:  Total IN: 0 mL    OUT:    Voided: 575 mL  Total OUT: 575 mL    Total NET: -575 mL          LABS:                          13.7   16.62 )-----------( 246      ( 31 Jan 2019 06:24 )             41.1         31 Jan 2019 06:24    141    |  104    |  36     ----------------------------<  245    4.0     |  22     |  1.5      Ca    8.8        31 Jan 2019 06:24  Phos  3.3       31 Jan 2019 06:24  Mg     2.1       31 Jan 2019 06:24    TPro  6.9    /  Alb  4.4    /  TBili  0.7    /  DBili  x      /  AST  16     /  ALT  25     /  AlkPhos  61     31 Jan 2019 06:24  Amylase x     lipase x                                                 PT/INR - ( 31 Jan 2019 06:24 )   PT: 12.70 sec;   INR: 1.18 ratio         PTT - ( 31 Jan 2019 06:24 )  PTT:27.9 sec                                             CARDIAC MARKERS ( 30 Jan 2019 14:32 )  x     / 0.01 ng/mL / x     / x     / x                                                                                                                                                 MEDICATIONS  (STANDING):  amLODIPine   Tablet 10 milliGRAM(s) Oral daily  atorvastatin 80 milliGRAM(s) Oral at bedtime  dextrose 5%. 1000 milliLiter(s) (50 mL/Hr) IV Continuous <Continuous>  dextrose 50% Injectable 12.5 Gram(s) IV Push once  dextrose 50% Injectable 25 Gram(s) IV Push once  dextrose 50% Injectable 25 Gram(s) IV Push once  insulin glargine Injectable (LANTUS) 10 Unit(s) SubCutaneous at bedtime  insulin lispro (HumaLOG) corrective regimen sliding scale   SubCutaneous three times a day before meals  insulin lispro Injectable (HumaLOG) 3 Unit(s) SubCutaneous before breakfast  insulin lispro Injectable (HumaLOG) 3 Unit(s) SubCutaneous before lunch  insulin lispro Injectable (HumaLOG) 3 Unit(s) SubCutaneous before dinner  lisinopril 40 milliGRAM(s) Oral daily  norepinephrine Infusion 0.05 MICROgram(s)/kG/Min (8.906 mL/Hr) IV Continuous <Continuous>  pantoprazole    Tablet 40 milliGRAM(s) Oral before breakfast  sodium chloride 0.9%. 1000 milliLiter(s) (75 mL/Hr) IV Continuous <Continuous>  tamsulosin 0.4 milliGRAM(s) Oral at bedtime  triamterene 37.5 mG/hydrochlorothiazide 25 mG Capsule 1 Capsule(s) Oral daily    MEDICATIONS  (PRN):  dextrose 40% Gel 15 Gram(s) Oral once PRN Blood Glucose LESS THAN 70 milliGRAM(s)/deciliter  glucagon  Injectable 1 milliGRAM(s) IntraMuscular once PRN Glucose LESS THAN 70 milligrams/deciliter          Xrays:  TLC:  OG:  ET tube:                                                                                       ECHO:
Patient is a 58y old  Male who presents with a chief complaint of stroke (03 Feb 2019 01:12)      Over Night Events:  Patient seen and examined.   off levophid     ROS:  See HPI    PHYSICAL EXAM    ICU Vital Signs Last 24 Hrs  T(C): 36.4 (03 Feb 2019 07:01), Max: 36.5 (02 Feb 2019 11:00)  T(F): 97.5 (03 Feb 2019 07:01), Max: 97.7 (02 Feb 2019 11:00)  HR: 98 (03 Feb 2019 05:00) (81 - 98)  BP: 135/88 (03 Feb 2019 05:00) (121/79 - 160/91)  BP(mean): 107 (03 Feb 2019 05:00) (91 - 117)  ABP: --  ABP(mean): --  RR: 18 (03 Feb 2019 07:01) (18 - 20)  SpO2: --      General: Aox3  HEENT:anjel                Lymph Nodes: NO cervical LN   Lungs: Bilateral BS  Cardiovascular: Regular   Abdomen: Soft, Positive BS  Extremities: No clubbing   Skin: warm  Neurological: improve in right strength 4/5 in upper 3/5 lower   Musculoskeletal: move all ext     I&O's Detail    02 Feb 2019 07:01  -  03 Feb 2019 07:00  --------------------------------------------------------  IN:    norepinephrine Infusion: 14 mL  Total IN: 14 mL    OUT:    Voided: 400 mL  Total OUT: 400 mL    Total NET: -386 mL          LABS:                          12.5   8.38  )-----------( 178      ( 02 Feb 2019 06:44 )             37.5         02 Feb 2019 06:44    142    |  104    |  11     ----------------------------<  121    4.1     |  28     |  1.1      Ca    8.6        02 Feb 2019 06:44  Phos  2.2       02 Feb 2019 06:44  Mg     2.1       02 Feb 2019 06:44                                                                                                                                                  Culture - Urine (collected 01 Feb 2019 08:00)  Source: .Urine Clean Catch (Midstream)  Final Report (02 Feb 2019 20:58):    <10,000 CFU/ml Normal Urogenital barbara present    Culture - Blood (collected 31 Jan 2019 15:46)  Source: .Blood None  Preliminary Report (01 Feb 2019 23:01):    No growth to date.                                                                                           MEDICATIONS  (STANDING):  atorvastatin 80 milliGRAM(s) Oral at bedtime  clopidogrel Tablet 75 milliGRAM(s) Oral daily  dextrose 5%. 1000 milliLiter(s) (50 mL/Hr) IV Continuous <Continuous>  dextrose 5%. 1000 milliLiter(s) (50 mL/Hr) IV Continuous <Continuous>  dextrose 50% Injectable 12.5 Gram(s) IV Push once  dextrose 50% Injectable 25 Gram(s) IV Push once  dextrose 50% Injectable 25 Gram(s) IV Push once  dextrose 50% Injectable 12.5 Gram(s) IV Push once  dextrose 50% Injectable 25 Gram(s) IV Push once  dextrose 50% Injectable 25 Gram(s) IV Push once  heparin  Injectable 5000 Unit(s) SubCutaneous every 8 hours  insulin glargine Injectable (LANTUS) 15 Unit(s) SubCutaneous at bedtime  insulin lispro Injectable (HumaLOG) 5 Unit(s) SubCutaneous three times a day before meals  norepinephrine Infusion 0.05 MICROgram(s)/kG/Min (8.906 mL/Hr) IV Continuous <Continuous>  pantoprazole    Tablet 40 milliGRAM(s) Oral before breakfast    MEDICATIONS  (PRN):  acetaminophen   Tablet .. 650 milliGRAM(s) Oral every 6 hours PRN Mild Pain (1 - 3)  dextrose 40% Gel 15 Gram(s) Oral once PRN Blood Glucose LESS THAN 70 milliGRAM(s)/deciliter  dextrose 40% Gel 15 Gram(s) Oral once PRN Blood Glucose LESS THAN 70 milliGRAM(s)/deciliter  glucagon  Injectable 1 milliGRAM(s) IntraMuscular once PRN Glucose LESS THAN 70 milligrams/deciliter  glucagon  Injectable 1 milliGRAM(s) IntraMuscular once PRN Glucose LESS THAN 70 milligrams/deciliter          Xrays:  TLC:  OG:  ET tube:                                                                                       ECHO:
Patient is a 58y old  Male who presents with a chief complaint of stroke (2019 08:36)      Over Night Events:  Patient seen and examined still on pressors but lower improve right upper ext better     ROS:  See HPI    PHYSICAL EXAM    ICU Vital Signs Last 24 Hrs  T(C): 37 (2019 07:05), Max: 37.2 (2019 13:00)  T(F): 98.6 (2019 07:05), Max: 99 (2019 13:00)  HR: 105 (2019 09:05) (90 - 121)  BP: 147/79 (2019 09:05) (145/83 - 164/104)  BP(mean): 104 (2019 09:05) (102 - 126)  ABP: --  ABP(mean): --  RR: 26 (2019 09:05) (13 - 27)  SpO2: 95% (2019 07:07) (93% - 97%)      General: AOx3  HEENT:             anjel   Lymph Nodes: NO cervical LN   Lungs: Bilateral BS  Cardiovascular: Regular   Abdomen: Soft, Positive BS  Extremities: No clubbing   Skin: warm   Neurological: improve in RUext 3-4/5   Musculoskeletal: move all ext     I&O's Detail    2019 07:01  -  2019 07:00  --------------------------------------------------------  IN:    insulin Infusion: 66 mL    norepinephrine Infusion: 1207 mL    sodium chloride 0.9%.: 1575 mL  Total IN: 2848 mL    OUT:    Voided: 2625 mL  Total OUT: 2625 mL    Total NET: 223 mL          LABS:                          13.0   11.08 )-----------( 227      ( 2019 05:51 )             39.1         2019 05:51    144    |  109    |  13     ----------------------------<  127    3.7     |  25     |  1.1      Ca    8.7        2019 05:51  Phos  2.0       2019 05:51  Mg     2.0       2019 05:51    TPro  6.6    /  Alb  4.0    /  TBili  0.6    /  DBili  x      /  AST  11     /  ALT  19     /  AlkPhos  55     2019 05:51  Amylase x     lipase x                                                 PT/INR - ( 2019 06:24 )   PT: 12.70 sec;   INR: 1.18 ratio         PTT - ( 2019 06:24 )  PTT:27.9 sec                                       Urinalysis Basic - ( 2019 08:00 )    Color: Yellow / Appearance: Clear / S.015 / pH: x  Gluc: x / Ketone: Negative  / Bili: Negative / Urobili: 0.2 mg/dL   Blood: x / Protein: Negative mg/dL / Nitrite: Negative   Leuk Esterase: Negative / RBC: x / WBC x   Sq Epi: x / Non Sq Epi: x / Bacteria: x          CARDIAC MARKERS ( 2019 14:32 )  x     / 0.01 ng/mL / x     / x     / x                                                                                                                                                 MEDICATIONS  (STANDING):  atorvastatin 80 milliGRAM(s) Oral at bedtime  clopidogrel Tablet 75 milliGRAM(s) Oral daily  dextrose 5%. 1000 milliLiter(s) (50 mL/Hr) IV Continuous <Continuous>  dextrose 50% Injectable 12.5 Gram(s) IV Push once  dextrose 50% Injectable 25 Gram(s) IV Push once  dextrose 50% Injectable 25 Gram(s) IV Push once  heparin  Injectable 5000 Unit(s) SubCutaneous every 8 hours  insulin Infusion 3 Unit(s)/Hr (3 mL/Hr) IV Continuous <Continuous>  insulin lispro Injectable (HumaLOG) 3 Unit(s) SubCutaneous before dinner  norepinephrine Infusion 0.05 MICROgram(s)/kG/Min (8.906 mL/Hr) IV Continuous <Continuous>  pantoprazole    Tablet 40 milliGRAM(s) Oral before breakfast  sodium chloride 0.9%. 1000 milliLiter(s) (75 mL/Hr) IV Continuous <Continuous>    MEDICATIONS  (PRN):  acetaminophen   Tablet .. 650 milliGRAM(s) Oral every 6 hours PRN Mild Pain (1 - 3)  dextrose 40% Gel 15 Gram(s) Oral once PRN Blood Glucose LESS THAN 70 milliGRAM(s)/deciliter  glucagon  Injectable 1 milliGRAM(s) IntraMuscular once PRN Glucose LESS THAN 70 milligrams/deciliter          Xrays:  TLC:  OG:  ET tube:                                                                                       ECHO:
Patient is a 58y old  Male who presents with a chief complaint of stroke (2019 14:49)      Over Night Events:  Patient seen and examined stable       ROS:  See HPI    PHYSICAL EXAM    ICU Vital Signs Last 24 Hrs  T(C): 36 (2019 07:01), Max: 36.6 (2019 15:01)  T(F): 96.8 (2019 07:01), Max: 97.9 (2019 15:01)  HR: 79 (2019 06:07) (75 - 105)  BP: 157/95 (2019 06:07) (125/67 - 180/89)  BP(mean): 118 (2019 06:07) (91 - 126)  ABP: --  ABP(mean): --  RR: 18 (2019 07:01) (18 - 26)  SpO2: 97% (2019 19:20) (97% - 97%)      General:  Aox3  HEENT:  anjel             Lymph Nodes: NO cervical LN   Lungs: Bilateral BS  Cardiovascular: Regular   Abdomen: Soft, Positive BS  Extremities: No clubbing   Skin: warm   Neurological: lower 3/5 upper right 3-4/5  Musculoskeletal: move all ext     I&O's Detail    2019 07:01  -  2019 07:00  --------------------------------------------------------  IN:    insulin Infusion: 68 mL    norepinephrine Infusion: 449 mL    sodium chloride 0.9%: 300 mL  Total IN: 817 mL    OUT:    Voided: 2025 mL  Total OUT: 2025 mL    Total NET: -1208 mL          LABS:                          12.5   8.38  )-----------( 178      ( 2019 06:44 )             37.5         2019 05:51    144    |  109    |  13     ----------------------------<  127    3.7     |  25     |  1.1      Ca    8.7        2019 05:51  Phos  2.0       2019 05:51  Mg     2.0       2019 05:51                                                                                      Urinalysis Basic - ( 2019 08:00 )    Color: Yellow / Appearance: Clear / S.015 / pH: x  Gluc: x / Ketone: Negative  / Bili: Negative / Urobili: 0.2 mg/dL   Blood: x / Protein: Negative mg/dL / Nitrite: Negative   Leuk Esterase: Negative / RBC: x / WBC x   Sq Epi: x / Non Sq Epi: x / Bacteria: x                                                                Culture - Blood (collected 2019 15:46)  Source: .Blood None  Preliminary Report (2019 23:01):    No growth to date.                                                                                           MEDICATIONS  (STANDING):  atorvastatin 80 milliGRAM(s) Oral at bedtime  clopidogrel Tablet 75 milliGRAM(s) Oral daily  dextrose 5%. 1000 milliLiter(s) (50 mL/Hr) IV Continuous <Continuous>  dextrose 50% Injectable 12.5 Gram(s) IV Push once  dextrose 50% Injectable 25 Gram(s) IV Push once  dextrose 50% Injectable 25 Gram(s) IV Push once  heparin  Injectable 5000 Unit(s) SubCutaneous every 8 hours  insulin Infusion 3 Unit(s)/Hr (3 mL/Hr) IV Continuous <Continuous>  norepinephrine Infusion 0.05 MICROgram(s)/kG/Min (8.906 mL/Hr) IV Continuous <Continuous>  pantoprazole    Tablet 40 milliGRAM(s) Oral before breakfast    MEDICATIONS  (PRN):  acetaminophen   Tablet .. 650 milliGRAM(s) Oral every 6 hours PRN Mild Pain (1 - 3)  dextrose 40% Gel 15 Gram(s) Oral once PRN Blood Glucose LESS THAN 70 milliGRAM(s)/deciliter  glucagon  Injectable 1 milliGRAM(s) IntraMuscular once PRN Glucose LESS THAN 70 milligrams/deciliter          Xrays:  TLC:  OG:  ET tube:                                                                                       ECHO:
SUBJECTIVE:    Patient is a 58y old Male who presents with a chief complaint of stroke (2019 10:51)    Currently admitted to medicine with the primary diagnosis of Stroke     Today is hospital day 2d. This morning he is resting comfortably in bed and reports no new issues or overnight events.     PAST MEDICAL & SURGICAL HISTORY  High cholesterol  Diabetes  HTN (hypertension)  CVA (cerebral vascular accident)    SOCIAL HISTORY:  Negative for smoking/alcohol/drug use.     ALLERGIES:  No Known Allergies    MEDICATIONS:  STANDING MEDICATIONS  atorvastatin 80 milliGRAM(s) Oral at bedtime  clopidogrel Tablet 75 milliGRAM(s) Oral daily  dextrose 5%. 1000 milliLiter(s) IV Continuous <Continuous>  dextrose 50% Injectable 12.5 Gram(s) IV Push once  dextrose 50% Injectable 25 Gram(s) IV Push once  dextrose 50% Injectable 25 Gram(s) IV Push once  heparin  Injectable 5000 Unit(s) SubCutaneous every 8 hours  insulin Infusion 3 Unit(s)/Hr IV Continuous <Continuous>  insulin lispro Injectable (HumaLOG) 3 Unit(s) SubCutaneous before dinner  norepinephrine Infusion 0.05 MICROgram(s)/kG/Min IV Continuous <Continuous>  pantoprazole    Tablet 40 milliGRAM(s) Oral before breakfast    PRN MEDICATIONS  acetaminophen   Tablet .. 650 milliGRAM(s) Oral every 6 hours PRN  dextrose 40% Gel 15 Gram(s) Oral once PRN  glucagon  Injectable 1 milliGRAM(s) IntraMuscular once PRN    VITALS:   T(F): 98.6  HR: 93  BP: 148/76  RR: 20  SpO2: 95%    LABS:                        13.0   11.08 )-----------( 227      ( 2019 05:51 )             39.1     -    144  |  109  |  13  ----------------------------<  127<H>  3.7   |  25  |  1.1    Ca    8.7      2019 05:51  Phos  2.0     -  Mg     2.0     -    TPro  6.6  /  Alb  4.0  /  TBili  0.6  /  DBili  x   /  AST  11  /  ALT  19  /  AlkPhos  55  02-    PT/INR - ( 2019 06:24 )   PT: 12.70 sec;   INR: 1.18 ratio         PTT - ( 2019 06:24 )  PTT:27.9 sec  Urinalysis Basic - ( 2019 08:00 )    Color: Yellow / Appearance: Clear / S.015 / pH: x  Gluc: x / Ketone: Negative  / Bili: Negative / Urobili: 0.2 mg/dL   Blood: x / Protein: Negative mg/dL / Nitrite: Negative   Leuk Esterase: Negative / RBC: x / WBC x   Sq Epi: x / Non Sq Epi: x / Bacteria: x                RADIOLOGY:  < from: CT Head No Cont (19 @ 15:38) >  No acute cranial hemorrhage. No significant changes from the prior exam.   No mass effect.    < end of copied text >    PHYSICAL EXAM:  GEN: No acute distress  LUNGS: Clear to auscultation bilaterally   HEART: S1/S2 present. RRR.   ABD: Soft, non-tender, non-distended. Bowel sounds present  EXT: NC/NC/NE/2+PP/RODNEY  NEURO: AAOX3, right upper extremity 3/5, rle 2/5
Neurology Follow up note    Name  BENTON AHMADI    HPI:  59 yo male with PMH of CVA 3 yrs ago no residual deficits on aspirin and statin, DLD, HTN p/w expressive aphasia and right sided hemiparesis. presented 2 hours after the onset of symptoms. Was given TPA in the ED, with mild improvement in his weakness, resolution of his le numbness, and resolution of his aphasia    [facility] at presentation 9 (31 Jan 2019 11:20)      Interval History:    PATIENT IS IMPROVING WITH RESPECT TO RUE STRENGTH  NO NEW COMPLAINTS  24 HR POST TPA CTH WAS NEG FOR BLEED  PATIENT REMAINS ON PRESSORS      Vital Signs Last 24 Hrs  T(C): 37 (01 Feb 2019 07:05), Max: 37.2 (31 Jan 2019 13:00)  T(F): 98.6 (01 Feb 2019 07:05), Max: 99 (31 Jan 2019 13:00)  HR: 92 (01 Feb 2019 07:07) (90 - 121)  BP: 149/80 (01 Feb 2019 07:07) (145/83 - 164/104)  BP(mean): 106 (01 Feb 2019 07:07) (102 - 126)  RR: 21 (01 Feb 2019 07:07) (13 - 27)  SpO2: 95% (01 Feb 2019 07:07) (93% - 97%)    Neurological Exam:   Mental status: Awake, alert and oriented x3.  Recent and remote memory intact.  Naming, repetition and comprehension intact.  Attention/concentration intact.  No dysarthria, no aphasia.  Fund of knowledge appropriate.  NO APHASIA  Cranial nerves: Pupils equally round and reactive to light, visual fields full, no nystagmus, extraocular muscles intact, V1 through V3 intact bilaterally and symmetric, R central facial, hearing intact to finger rub, palate elevation symmetric, tongue was midline.  Motor:  5/5 lue/lle, trace RLE, 4-/5 rue diffusely  Sensation: Intact to light touch,  Coordination: No dysmetria on finger-to-nose and heel-to-shin.  No dysdiadokinesia.  Reflexes: 2+ in bilateral UE/LE, downgoing toes bilaterally. (-) Garces.      Medications  acetaminophen   Tablet .. 650 milliGRAM(s) Oral every 6 hours PRN  atorvastatin 80 milliGRAM(s) Oral at bedtime  clopidogrel Tablet 75 milliGRAM(s) Oral daily  dextrose 40% Gel 15 Gram(s) Oral once PRN  dextrose 5%. 1000 milliLiter(s) IV Continuous <Continuous>  dextrose 50% Injectable 12.5 Gram(s) IV Push once  dextrose 50% Injectable 25 Gram(s) IV Push once  dextrose 50% Injectable 25 Gram(s) IV Push once  glucagon  Injectable 1 milliGRAM(s) IntraMuscular once PRN  heparin  Injectable 5000 Unit(s) SubCutaneous every 8 hours  insulin Infusion 3 Unit(s)/Hr IV Continuous <Continuous>  insulin lispro Injectable (HumaLOG) 3 Unit(s) SubCutaneous before dinner  norepinephrine Infusion 0.05 MICROgram(s)/kG/Min IV Continuous <Continuous>  pantoprazole    Tablet 40 milliGRAM(s) Oral before breakfast  sodium chloride 0.9%. 1000 milliLiter(s) IV Continuous <Continuous>      Lab  02-01    144  |  109  |  13  ----------------------------<  127<H>  3.7   |  25  |  1.1    Ca    8.7      01 Feb 2019 05:51  Phos  2.0     02-01  Mg     2.0     02-01    TPro  6.6  /  Alb  4.0  /  TBili  0.6  /  DBili  x   /  AST  11  /  ALT  19  /  AlkPhos  55  02-01                          13.0   11.08 )-----------( 227      ( 01 Feb 2019 05:51 )             39.1     LIVER FUNCTIONS - ( 01 Feb 2019 05:51 )  Alb: 4.0 g/dL / Pro: 6.6 g/dL / ALK PHOS: 55 U/L / ALT: 19 U/L / AST: 11 U/L / GGT: x             2.0        Radiology  CT Head No Cont:   EXAM:  CT BRAIN            PROCEDURE DATE:  01/31/2019            INTERPRETATION:  Clinical History / Reason for exam: Post TPA. Stroke.    Technique: Noncontrast head CT.  Contiguous unenhanced CT axial images of   the head from the base to the vertex.    Comparison:  Head CT 1/30/2018.    Findings/  impression:    No acute cranial hemorrhage. No significant changes from the prior exam.   No mass effect.            Assessment: 59 YO MAN WITH ACUTE CVA, LIKELY L MCA TERRITORY. S/P TPA. NO INTERVENTION/LVO  POST TPA CTH NEG FOR BLEED  PATIENT IS IMPROVING CLINICALLY  REMAINS ON PRESSORS HOWEVER    Plan:  MRI BRAIN NC ONCE OFF PRESSORS AND STABLE TO GO FOR STUDY  CONT PLAVIX 75 MG QD  CONT LIPITOR  PT/REHAB  PLEASE CALL WITH ANY QUESTIONS

## 2019-02-03 NOTE — DISCHARGE NOTE ADULT - CARE PROVIDER_API CALL
Silviano Wells (MD)  Medicine  [address] [place] [address]  Phone: [phone]  Fax: [phone]    Taqueria Vann)  EEGEpilepsy; Neurology  [address] [place] [address], [address]  [place] [address]  Phone: [phone]  Fax: [phone]

## 2019-02-03 NOTE — DISCHARGE NOTE ADULT - PLAN OF CARE
resolution of symptoms take medications as prescribed. follow up with your pmd and neurology  take your bp in the morning and at night, keep a log and when you see dr silveira show him the log to determine the BP meds needed take medications as prescribed. follow up with your pmd and neurology  take your bp in the morning and at night, keep a log and when you see dr wells show him the log to determine the BP meds needed and at what time of the day. also follow up with Dr. Wells regarding hypercoagulable workup to be done as outpatient.

## 2019-02-03 NOTE — PROGRESS NOTE ADULT - ASSESSMENT
IMPRESSION:  Stroke s/p TPA   hypotension     PLAN:    CNS   neurology follow up   MRi today   follow neurology if ok to go to floor   will get benefit from 4 A rehab     HEENT: oral care     PULMONARY: keep pox > 92%      CARDIOVASCULAR: echo now  , start plavix  24 hrs   start lipitor   taper off pressors       GI: GI prophylaxis. oral feed     RENAL: follow lytes     INFECTIOUS DISEASE: no abx , pan cx for now no sign of infection     HEMATOLOGICAL:  DVT prophylaxis. heparin sq     ENDOCRINE:  Follow up FS.  Insulin protocol if needed.      rehab / PT

## 2019-02-05 LAB
CULTURE RESULTS: SIGNIFICANT CHANGE UP
SPECIMEN SOURCE: SIGNIFICANT CHANGE UP

## 2019-02-07 DIAGNOSIS — I95.9 HYPOTENSION, UNSPECIFIED: ICD-10-CM

## 2019-02-07 DIAGNOSIS — I63.9 CEREBRAL INFARCTION, UNSPECIFIED: ICD-10-CM

## 2019-02-07 DIAGNOSIS — R47.01 APHASIA: ICD-10-CM

## 2019-02-07 DIAGNOSIS — G81.91 HEMIPLEGIA, UNSPECIFIED AFFECTING RIGHT DOMINANT SIDE: ICD-10-CM

## 2019-02-07 DIAGNOSIS — Z86.73 PERSONAL HISTORY OF TRANSIENT ISCHEMIC ATTACK (TIA), AND CEREBRAL INFARCTION WITHOUT RESIDUAL DEFICITS: ICD-10-CM

## 2019-02-07 DIAGNOSIS — E11.9 TYPE 2 DIABETES MELLITUS WITHOUT COMPLICATIONS: ICD-10-CM

## 2019-02-07 DIAGNOSIS — I10 ESSENTIAL (PRIMARY) HYPERTENSION: ICD-10-CM

## 2019-02-07 DIAGNOSIS — R65.10 SYSTEMIC INFLAMMATORY RESPONSE SYNDROME (SIRS) OF NON-INFECTIOUS ORIGIN WITHOUT ACUTE ORGAN DYSFUNCTION: ICD-10-CM

## 2019-02-07 DIAGNOSIS — R29.709 NIHSS SCORE 9: ICD-10-CM

## 2019-02-14 PROBLEM — I63.9 CEREBRAL INFARCTION, UNSPECIFIED: Chronic | Status: ACTIVE | Noted: 2019-01-30

## 2019-02-14 PROBLEM — E78.00 PURE HYPERCHOLESTEROLEMIA, UNSPECIFIED: Chronic | Status: ACTIVE | Noted: 2019-01-30

## 2019-02-14 PROBLEM — I10 ESSENTIAL (PRIMARY) HYPERTENSION: Chronic | Status: ACTIVE | Noted: 2019-01-30

## 2019-02-14 PROBLEM — E11.9 TYPE 2 DIABETES MELLITUS WITHOUT COMPLICATIONS: Chronic | Status: ACTIVE | Noted: 2019-01-30

## 2019-03-15 ENCOUNTER — OUTPATIENT (OUTPATIENT)
Dept: OUTPATIENT SERVICES | Facility: HOSPITAL | Age: 59
LOS: 1 days | Discharge: HOME | End: 2019-03-15

## 2019-03-15 DIAGNOSIS — I63.9 CEREBRAL INFARCTION, UNSPECIFIED: ICD-10-CM

## 2019-09-11 ENCOUNTER — APPOINTMENT (OUTPATIENT)
Dept: NEUROLOGY | Facility: CLINIC | Age: 59
End: 2019-09-11

## 2020-01-02 NOTE — ED ADULT NURSE NOTE - NIH STROKE SCALE: 1B. LOC QUESTIONS, QM
(0) Answers both questions correctly
no difficulty walking/imbalance/no seizures/no change in level of consciousness

## 2021-11-12 NOTE — ED ADULT TRIAGE NOTE - PAIN RATING/NUMBER SCALE (0-10): ACTIVITY
Admitting Diagnosis:   Patient is a 70y old  Male who presents with a chief complaint of Acute hypoxic respiratory failure 2/2 covid pna (12 Nov 2021 08:25)      PAST MEDICAL & SURGICAL HISTORY:  No pertinent past medical history    HTN (hypertension)    Chronic back pain    Major depression, chronic    No significant past surgical history        Current Nutrition Order:  Minced and Moist, DASH, Ensure 1x/day (350kcal, 20g pro)    PO Intake: Good (%) [   ]  Fair (50-75%) [   ] Poor (<25%) [X   ]    GI Issues: He denied complaints of N/V  No BM x 5 days (not ordered for bowel regimen, team aware)    Pain: He denied complaints of pain     Skin Integrity: Westley 14, generalized trace edema  Buttocks stage II pressure injury     Labs:   11-12    139  |  108  |  34<H>  ----------------------------<  123<H>  See Note   |  21<L>  |  0.79    Ca    9.7      12 Nov 2021 05:53  Phos  3.7     11-12  Mg     2.6     11-12    TPro  6.9  /  Alb  2.9<L>  /  TBili  0.6  /  DBili  x   /  AST  38  /  ALT  38  /  AlkPhos  80  11-11    CAPILLARY BLOOD GLUCOSE      POCT Blood Glucose.: 104 mg/dL (12 Nov 2021 11:28)  POCT Blood Glucose.: 119 mg/dL (12 Nov 2021 06:52)  POCT Blood Glucose.: 210 mg/dL (11 Nov 2021 22:22)  POCT Blood Glucose.: 154 mg/dL (11 Nov 2021 16:47)      Medications:  MEDICATIONS  (STANDING):  amLODIPine   Tablet 10 milliGRAM(s) Oral every 24 hours  chlorhexidine 2% Cloths 1 Application(s) Topical <User Schedule>  enoxaparin Injectable 80 milliGRAM(s) SubCutaneous every 12 hours  insulin lispro (ADMELOG) corrective regimen sliding scale   SubCutaneous Before meals and at bedtime  losartan 50 milliGRAM(s) Oral daily  methylPREDNISolone sodium succinate Injectable 40 milliGRAM(s) IV Push every 12 hours  mirtazapine 30 milliGRAM(s) Oral at bedtime  pantoprazole    Tablet 40 milliGRAM(s) Oral before breakfast  polyethylene glycol 3350 17 Gram(s) Oral every 12 hours  senna 2 Tablet(s) Oral at bedtime  tamsulosin 0.4 milliGRAM(s) Oral at bedtime  trimethoprim  160 mG/sulfamethoxazole 800 mG 1 Tablet(s) Oral every 12 hours    MEDICATIONS  (PRN):  acetaminophen     Tablet .. 650 milliGRAM(s) Oral every 6 hours PRN Temp greater or equal to 38C (100.4F), Mild Pain (1 - 3)      Weight: 92.1kg     Weight Change: No new weights recorded since admit     Nutrition Focused Physical Exam: Completed [   ]  Not Pertinent [ X  ]    Estimated energy needs: Height 71"; ABW 92.1kg; IBW 78kg; 118%IBW; BMI 24.4  IBW used for calculations as pt >100% IBW in critical care setting (118%), adjusted for age, COVID, fluids per team  Calories: 25-30 kcal/kg = 7682-7390 kcal/day  Protein: 1.2-1.4 g/kg = 94-109g pro/day  Fluids per team     Subjective: 70 year old male with PMx of HTN, depression, chronic back pain, transferred from [facility], where he was admitted on 11/2 after an unwitnessed fall (down for >1 day) and found to have acute hypoxic respiratory failure secondary to COVID-19 pneumonia. Course also c/b +UCx- on bactrim. Pt seen in room and discussed during MICU rounds. Breathing on HFNC (FiO2 60%, satting in low 90s). Pt awake but sleepy during interview. Observed <30% intake of breakfast tray, <50% of Ensure consumed. Discussed importance of adequate PO intake w/patient. Noted that pt is receiving remeron. Tried to obtain food preferences from pt but none provided. He denied N/V or pain. Noted constipation, team made aware. WBC 21.31 (H), POC , 210mg/dL, lytes WNL. Will continue to follow per RD protocol.       Previous Nutrition Diagnosis:   Inadequate energy intake RT down after mechanical fall and now decreased appetite 2/2 COVID AEB meeting <75% EER at this time.   Active [  X ]  Resolved [   ]    If resolved, new PES:     Goal: pt will be encouraged to have >50% intake per meal     Recommendations:  1. Continue with current diet. Encourage intake. Maintain aspiration precautions at all times   2. Monitor lytes and replete prn. POC BG q6hrs while on steroids  3. Pain and bowel regimens per team   *d/w team    Education: discussed importance of adequate PO intake, focus on lean proteins and snacks    Risk Level: High [ X  ] Moderate [   ] Low [   ] 5

## 2022-02-19 NOTE — PHYSICAL THERAPY INITIAL EVALUATION ADULT - FUNCTIONAL LIMITATIONS, PT EVAL
If there is any recurrent bleeding pinch the nose with the nose clamp as directed and leave it on for 15 minutes continuously.  If this does not lead to resolution of the bleeding return here for recheck.  If not completely well by Monday call your doctor and arrange office follow-up during the week  
home management/community/leisure

## 2023-01-12 ENCOUNTER — INPATIENT (INPATIENT)
Facility: HOSPITAL | Age: 63
LOS: 2 days | Discharge: HOME | End: 2023-01-15
Payer: COMMERCIAL

## 2023-01-12 VITALS
HEART RATE: 96 BPM | OXYGEN SATURATION: 99 % | RESPIRATION RATE: 18 BRPM | DIASTOLIC BLOOD PRESSURE: 74 MMHG | SYSTOLIC BLOOD PRESSURE: 210 MMHG

## 2023-01-12 LAB
ALBUMIN SERPL ELPH-MCNC: 4.6 G/DL — SIGNIFICANT CHANGE UP (ref 3.5–5.2)
ALP SERPL-CCNC: 56 U/L — SIGNIFICANT CHANGE UP (ref 30–115)
ALT FLD-CCNC: 34 U/L — SIGNIFICANT CHANGE UP (ref 0–41)
ANION GAP SERPL CALC-SCNC: 10 MMOL/L — SIGNIFICANT CHANGE UP (ref 7–14)
APTT BLD: 29 SEC — SIGNIFICANT CHANGE UP (ref 27–39.2)
AST SERPL-CCNC: 25 U/L — SIGNIFICANT CHANGE UP (ref 0–41)
BASOPHILS # BLD AUTO: 0.04 K/UL — SIGNIFICANT CHANGE UP (ref 0–0.2)
BASOPHILS NFR BLD AUTO: 0.4 % — SIGNIFICANT CHANGE UP (ref 0–1)
BILIRUB SERPL-MCNC: 0.5 MG/DL — SIGNIFICANT CHANGE UP (ref 0.2–1.2)
BUN SERPL-MCNC: 30 MG/DL — HIGH (ref 10–20)
CALCIUM SERPL-MCNC: 9.8 MG/DL — SIGNIFICANT CHANGE UP (ref 8.4–10.5)
CHLORIDE SERPL-SCNC: 101 MMOL/L — SIGNIFICANT CHANGE UP (ref 98–110)
CO2 SERPL-SCNC: 29 MMOL/L — SIGNIFICANT CHANGE UP (ref 17–32)
CREAT SERPL-MCNC: 1.4 MG/DL — SIGNIFICANT CHANGE UP (ref 0.7–1.5)
EGFR: 56 ML/MIN/1.73M2 — LOW
EOSINOPHIL # BLD AUTO: 0.12 K/UL — SIGNIFICANT CHANGE UP (ref 0–0.7)
EOSINOPHIL NFR BLD AUTO: 1.1 % — SIGNIFICANT CHANGE UP (ref 0–8)
GLUCOSE BLDC GLUCOMTR-MCNC: 177 MG/DL — HIGH (ref 70–99)
GLUCOSE BLDC GLUCOMTR-MCNC: 179 MG/DL — HIGH (ref 70–99)
GLUCOSE BLDC GLUCOMTR-MCNC: 191 MG/DL — HIGH (ref 70–99)
GLUCOSE SERPL-MCNC: 195 MG/DL — HIGH (ref 70–99)
HCT VFR BLD CALC: 41.2 % — LOW (ref 42–52)
HGB BLD-MCNC: 13.8 G/DL — LOW (ref 14–18)
IMM GRANULOCYTES NFR BLD AUTO: 1.1 % — HIGH (ref 0.1–0.3)
INR BLD: 1.08 RATIO — SIGNIFICANT CHANGE UP (ref 0.65–1.3)
LYMPHOCYTES # BLD AUTO: 1.52 K/UL — SIGNIFICANT CHANGE UP (ref 1.2–3.4)
LYMPHOCYTES # BLD AUTO: 14.5 % — LOW (ref 20.5–51.1)
MCHC RBC-ENTMCNC: 29.5 PG — SIGNIFICANT CHANGE UP (ref 27–31)
MCHC RBC-ENTMCNC: 33.5 G/DL — SIGNIFICANT CHANGE UP (ref 32–37)
MCV RBC AUTO: 88 FL — SIGNIFICANT CHANGE UP (ref 80–94)
MONOCYTES # BLD AUTO: 0.83 K/UL — HIGH (ref 0.1–0.6)
MONOCYTES NFR BLD AUTO: 7.9 % — SIGNIFICANT CHANGE UP (ref 1.7–9.3)
NEUTROPHILS # BLD AUTO: 7.85 K/UL — HIGH (ref 1.4–6.5)
NEUTROPHILS NFR BLD AUTO: 75 % — SIGNIFICANT CHANGE UP (ref 42.2–75.2)
NRBC # BLD: 0 /100 WBCS — SIGNIFICANT CHANGE UP (ref 0–0)
PLATELET # BLD AUTO: 226 K/UL — SIGNIFICANT CHANGE UP (ref 130–400)
POTASSIUM SERPL-MCNC: 4.2 MMOL/L — SIGNIFICANT CHANGE UP (ref 3.5–5)
POTASSIUM SERPL-SCNC: 4.2 MMOL/L — SIGNIFICANT CHANGE UP (ref 3.5–5)
PROT SERPL-MCNC: 7.1 G/DL — SIGNIFICANT CHANGE UP (ref 6–8)
PROTHROM AB SERPL-ACNC: 12.3 SEC — SIGNIFICANT CHANGE UP (ref 9.95–12.87)
RBC # BLD: 4.68 M/UL — LOW (ref 4.7–6.1)
RBC # FLD: 14.6 % — HIGH (ref 11.5–14.5)
SARS-COV-2 RNA SPEC QL NAA+PROBE: SIGNIFICANT CHANGE UP
SODIUM SERPL-SCNC: 140 MMOL/L — SIGNIFICANT CHANGE UP (ref 135–146)
TROPONIN T SERPL-MCNC: <0.01 NG/ML — SIGNIFICANT CHANGE UP
WBC # BLD: 10.47 K/UL — SIGNIFICANT CHANGE UP (ref 4.8–10.8)
WBC # FLD AUTO: 10.47 K/UL — SIGNIFICANT CHANGE UP (ref 4.8–10.8)

## 2023-01-12 PROCEDURE — 71045 X-RAY EXAM CHEST 1 VIEW: CPT | Mod: 26,77,76

## 2023-01-12 PROCEDURE — 31500 INSERT EMERGENCY AIRWAY: CPT

## 2023-01-12 PROCEDURE — 99291 CRITICAL CARE FIRST HOUR: CPT | Mod: 25

## 2023-01-12 PROCEDURE — 71045 X-RAY EXAM CHEST 1 VIEW: CPT | Mod: 26

## 2023-01-12 PROCEDURE — 70450 CT HEAD/BRAIN W/O DYE: CPT | Mod: 26,77

## 2023-01-12 PROCEDURE — 99292 CRITICAL CARE ADDL 30 MIN: CPT | Mod: 25

## 2023-01-12 PROCEDURE — 70498 CT ANGIOGRAPHY NECK: CPT | Mod: 26,MA

## 2023-01-12 PROCEDURE — 93010 ELECTROCARDIOGRAM REPORT: CPT

## 2023-01-12 PROCEDURE — 70450 CT HEAD/BRAIN W/O DYE: CPT | Mod: 26,59,MA

## 2023-01-12 PROCEDURE — 0042T: CPT

## 2023-01-12 PROCEDURE — 70496 CT ANGIOGRAPHY HEAD: CPT | Mod: 26,MA

## 2023-01-12 RX ORDER — DEXMEDETOMIDINE HYDROCHLORIDE IN 0.9% SODIUM CHLORIDE 4 UG/ML
0.2 INJECTION INTRAVENOUS
Qty: 200 | Refills: 0 | Status: DISCONTINUED | OUTPATIENT
Start: 2023-01-12 | End: 2023-01-12

## 2023-01-12 RX ORDER — SITAGLIPTIN AND METFORMIN HYDROCHLORIDE 500; 50 MG/1; MG/1
1 TABLET, FILM COATED ORAL
Qty: 0 | Refills: 0 | DISCHARGE

## 2023-01-12 RX ORDER — PIOGLITAZONE HYDROCHLORIDE 15 MG/1
1 TABLET ORAL
Qty: 0 | Refills: 0 | DISCHARGE

## 2023-01-12 RX ORDER — SODIUM CHLORIDE 9 MG/ML
10 INJECTION INTRAMUSCULAR; INTRAVENOUS; SUBCUTANEOUS ONCE
Refills: 0 | Status: COMPLETED | OUTPATIENT
Start: 2023-01-12 | End: 2023-01-12

## 2023-01-12 RX ORDER — ROSUVASTATIN CALCIUM 5 MG/1
1 TABLET ORAL
Qty: 0 | Refills: 0 | DISCHARGE

## 2023-01-12 RX ORDER — SODIUM CHLORIDE 9 MG/ML
10 INJECTION INTRAMUSCULAR; INTRAVENOUS; SUBCUTANEOUS ONCE
Refills: 0 | Status: DISCONTINUED | OUTPATIENT
Start: 2023-01-12 | End: 2023-01-12

## 2023-01-12 RX ORDER — NICARDIPINE HYDROCHLORIDE 30 MG/1
5 CAPSULE, EXTENDED RELEASE ORAL
Qty: 40 | Refills: 0 | Status: DISCONTINUED | OUTPATIENT
Start: 2023-01-12 | End: 2023-01-12

## 2023-01-12 RX ORDER — FENTANYL CITRATE 50 UG/ML
0.5 INJECTION INTRAVENOUS
Qty: 2500 | Refills: 0 | Status: DISCONTINUED | OUTPATIENT
Start: 2023-01-12 | End: 2023-01-14

## 2023-01-12 RX ORDER — ROCURONIUM BROMIDE 10 MG/ML
100 VIAL (ML) INTRAVENOUS ONCE
Refills: 0 | Status: COMPLETED | OUTPATIENT
Start: 2023-01-12 | End: 2023-01-12

## 2023-01-12 RX ORDER — FINASTERIDE 5 MG/1
1 TABLET, FILM COATED ORAL
Qty: 0 | Refills: 0 | DISCHARGE

## 2023-01-12 RX ORDER — NOREPINEPHRINE BITARTRATE/D5W 8 MG/250ML
0.05 PLASTIC BAG, INJECTION (ML) INTRAVENOUS
Qty: 8 | Refills: 0 | Status: DISCONTINUED | OUTPATIENT
Start: 2023-01-12 | End: 2023-01-15

## 2023-01-12 RX ORDER — LABETALOL HCL 100 MG
10 TABLET ORAL
Refills: 0 | Status: DISCONTINUED | OUTPATIENT
Start: 2023-01-12 | End: 2023-01-12

## 2023-01-12 RX ORDER — LISINOPRIL 2.5 MG/1
1 TABLET ORAL
Qty: 0 | Refills: 0 | DISCHARGE

## 2023-01-12 RX ORDER — DEXMEDETOMIDINE HYDROCHLORIDE IN 0.9% SODIUM CHLORIDE 4 UG/ML
0.2 INJECTION INTRAVENOUS
Qty: 400 | Refills: 0 | Status: DISCONTINUED | OUTPATIENT
Start: 2023-01-12 | End: 2023-01-14

## 2023-01-12 RX ORDER — LISINOPRIL/HYDROCHLOROTHIAZIDE 10-12.5 MG
1 TABLET ORAL
Qty: 0 | Refills: 0 | DISCHARGE

## 2023-01-12 RX ORDER — SODIUM CHLORIDE 9 MG/ML
500 INJECTION INTRAMUSCULAR; INTRAVENOUS; SUBCUTANEOUS ONCE
Refills: 0 | Status: COMPLETED | OUTPATIENT
Start: 2023-01-12 | End: 2023-01-12

## 2023-01-12 RX ORDER — ASPIRIN/CALCIUM CARB/MAGNESIUM 324 MG
1 TABLET ORAL
Qty: 0 | Refills: 0 | DISCHARGE

## 2023-01-12 RX ORDER — METFORMIN HYDROCHLORIDE 850 MG/1
1 TABLET ORAL
Qty: 0 | Refills: 0 | DISCHARGE

## 2023-01-12 RX ORDER — SODIUM CHLORIDE 9 MG/ML
1000 INJECTION INTRAMUSCULAR; INTRAVENOUS; SUBCUTANEOUS ONCE
Refills: 0 | Status: COMPLETED | OUTPATIENT
Start: 2023-01-12 | End: 2023-01-12

## 2023-01-12 RX ORDER — TENECTEPLASE 50 MG
25 KIT INTRAVENOUS ONCE
Refills: 0 | Status: COMPLETED | OUTPATIENT
Start: 2023-01-12 | End: 2023-01-12

## 2023-01-12 RX ORDER — ETOMIDATE 2 MG/ML
20 INJECTION INTRAVENOUS ONCE
Refills: 0 | Status: COMPLETED | OUTPATIENT
Start: 2023-01-12 | End: 2023-01-12

## 2023-01-12 RX ORDER — POTASSIUM CHLORIDE 20 MEQ
20 PACKET (EA) ORAL ONCE
Refills: 0 | Status: COMPLETED | OUTPATIENT
Start: 2023-01-12 | End: 2023-01-12

## 2023-01-12 RX ORDER — PROPOFOL 10 MG/ML
5 INJECTION, EMULSION INTRAVENOUS
Qty: 1000 | Refills: 0 | Status: DISCONTINUED | OUTPATIENT
Start: 2023-01-12 | End: 2023-01-14

## 2023-01-12 RX ORDER — PANTOPRAZOLE SODIUM 20 MG/1
40 TABLET, DELAYED RELEASE ORAL DAILY
Refills: 0 | Status: DISCONTINUED | OUTPATIENT
Start: 2023-01-12 | End: 2023-01-15

## 2023-01-12 RX ADMIN — Medication 50 MILLIEQUIVALENT(S): at 17:51

## 2023-01-12 RX ADMIN — Medication 9.75 MICROGRAM(S)/KG/MIN: at 17:50

## 2023-01-12 RX ADMIN — SODIUM CHLORIDE 1000 MILLILITER(S): 9 INJECTION INTRAMUSCULAR; INTRAVENOUS; SUBCUTANEOUS at 13:37

## 2023-01-12 RX ADMIN — FENTANYL CITRATE 5.2 MICROGRAM(S)/KG/HR: 50 INJECTION INTRAVENOUS at 11:07

## 2023-01-12 RX ADMIN — DEXMEDETOMIDINE HYDROCHLORIDE IN 0.9% SODIUM CHLORIDE 5.2 MICROGRAM(S)/KG/HR: 4 INJECTION INTRAVENOUS at 22:11

## 2023-01-12 RX ADMIN — ETOMIDATE 20 MILLIGRAM(S): 2 INJECTION INTRAVENOUS at 10:55

## 2023-01-12 RX ADMIN — NICARDIPINE HYDROCHLORIDE 25 MG/HR: 30 CAPSULE, EXTENDED RELEASE ORAL at 11:09

## 2023-01-12 RX ADMIN — SODIUM CHLORIDE 10 MILLILITER(S): 9 INJECTION INTRAMUSCULAR; INTRAVENOUS; SUBCUTANEOUS at 13:36

## 2023-01-12 RX ADMIN — Medication 9.75 MICROGRAM(S)/KG/MIN: at 20:00

## 2023-01-12 RX ADMIN — DEXMEDETOMIDINE HYDROCHLORIDE IN 0.9% SODIUM CHLORIDE 5.2 MICROGRAM(S)/KG/HR: 4 INJECTION INTRAVENOUS at 17:51

## 2023-01-12 RX ADMIN — FENTANYL CITRATE 5.2 MICROGRAM(S)/KG/HR: 50 INJECTION INTRAVENOUS at 20:00

## 2023-01-12 RX ADMIN — Medication 10 MILLIGRAM(S): at 11:10

## 2023-01-12 RX ADMIN — SODIUM CHLORIDE 3000 MILLILITER(S): 9 INJECTION INTRAMUSCULAR; INTRAVENOUS; SUBCUTANEOUS at 11:48

## 2023-01-12 RX ADMIN — PROPOFOL 3.12 MICROGRAM(S)/KG/MIN: 10 INJECTION, EMULSION INTRAVENOUS at 11:08

## 2023-01-12 RX ADMIN — PANTOPRAZOLE SODIUM 40 MILLIGRAM(S): 20 TABLET, DELAYED RELEASE ORAL at 17:51

## 2023-01-12 RX ADMIN — SODIUM CHLORIDE 10 MILLILITER(S): 9 INJECTION INTRAMUSCULAR; INTRAVENOUS; SUBCUTANEOUS at 11:47

## 2023-01-12 RX ADMIN — Medication 9.75 MICROGRAM(S)/KG/MIN: at 13:37

## 2023-01-12 RX ADMIN — FENTANYL CITRATE 5.2 MICROGRAM(S)/KG/HR: 50 INJECTION INTRAVENOUS at 17:53

## 2023-01-12 RX ADMIN — TENECTEPLASE 3600 MILLIGRAM(S): KIT at 10:57

## 2023-01-12 RX ADMIN — DEXMEDETOMIDINE HYDROCHLORIDE IN 0.9% SODIUM CHLORIDE 5.2 MICROGRAM(S)/KG/HR: 4 INJECTION INTRAVENOUS at 20:01

## 2023-01-12 RX ADMIN — DEXMEDETOMIDINE HYDROCHLORIDE IN 0.9% SODIUM CHLORIDE 5.2 MICROGRAM(S)/KG/HR: 4 INJECTION INTRAVENOUS at 13:36

## 2023-01-12 RX ADMIN — Medication 100 MILLIGRAM(S): at 10:55

## 2023-01-12 NOTE — ED PROVIDER NOTE - NIH STROKE SCALE: 1A. LEVEL OF CONSCIOUSNESS, QM
(3) Responds only with reflex motor or autonomic effects or totally unresponsive, flaccid, and areflexic (2) Not alert; requires repeated stimulation to attend, or is obtunded and requires strong or painful stimulation to make movements (not stereotyped)

## 2023-01-12 NOTE — ED ADULT NURSE NOTE - CHIEF COMPLAINT QUOTE
Patient called in as EMS notification for slurred speech and generalized weakness starting at 7:30am
other

## 2023-01-12 NOTE — ED PROVIDER NOTE - NSICDXPASTMEDICALHX_GEN_ALL_CORE_FT
PAST MEDICAL HISTORY:  CVA (cerebral vascular accident)     Diabetes     High cholesterol     HTN (hypertension)

## 2023-01-12 NOTE — ED PROVIDER NOTE - OBJECTIVE STATEMENT
63 y/o male with hx of CVA x 2 , DLD, HTN, NIDDM, on daily asa and plavix, recent flu and covid diagnosis presents to the ED for evaluation. as per family, patient c/o weakness in legs at 9 am . EMS arrival patient with left sided weakness and slurred speech and during transport to hospital patient deteriorated. patient presents to the ED awake following minimal commands. stroke code immediately called

## 2023-01-12 NOTE — ED ADULT NURSE NOTE - BEFAST BALANCE
Yes [employer] Counseling:  I discussed with the patient the risks of mycophenolate mofetil including but not limited to infection/immunosuppression, GI upset, hypokalemia, hypercholesterolemia, bone marrow suppression, lymphoproliferative disorders, malignancy, GI ulceration/bleed/perforation, colitis, interstitial lung disease, kidney failure, progressive multifocal leukoencephalopathy, and birth defects.  The patient understands that monitoring is required including a baseline creatinine and regular CBC testing. In addition, patient must alert us immediately if symptoms of infection or other concerning signs are noted.

## 2023-01-12 NOTE — ED PROVIDER NOTE - CLINICAL SUMMARY MEDICAL DECISION MAKING FREE TEXT BOX
62-year-old male above past medical history brought in by EMS as prenotification for possible stroke, per EMS patient last known well 930, had slurred speech and left-sided weakness, had deteriorating mental status and ability to speak in route, on discussion with wife patient's last known well was 730 this morning, walking and talking normally, at around 9 AM patient called the wife to say that he was feeling weak in his legs and did not feel right and ambulance was called, patient had recent flu and COVID, no falls or other issues, on arrival patient somnolent but arousable to pain, vital signs as above (initial blood pressure 150/109), blinks his eyes and will weakly grasp my fingers, initially had gag reflex, cor regular, lungs CTA, abdomen soft nontender, patient taken emergently to CAT scan, vomited once and had a GCS of 4 upon return from CAT scan, after discussion with wife patient intubated for protection, blood pressure subsequently 230s over 120s, controlled with labetalol and Cardene as well as sedation, again after discussion with family decision made to administer TN K, given 1057, patient return to CAT scan for angio, no LVO, for admission [facility] (see progress notes).

## 2023-01-12 NOTE — ED PROVIDER NOTE - NSICDXFAMILYHX_GEN_ALL_CORE_FT
FAMILY HISTORY:  Mother  Still living? Unknown  Family history of diabetes mellitus (DM), Age at diagnosis: Age Unknown  Family history of hypertension, Age at diagnosis: Age Unknown

## 2023-01-12 NOTE — H&P ADULT - NSHPPHYSICALEXAM_GEN_ALL_CORE
VITALS:   T(C): --  HR: 102 (01-12-23 @ 15:00) (72 - 102)  BP: 167/79 (01-12-23 @ 15:00) (112/63 - 210/74)  RR: 18 (01-12-23 @ 15:00) (16 - 18)  SpO2: 98% (01-12-23 @ 15:00) (97% - 99%)    GENERAL: NAD, lying in bed comfortably  HEAD:  intubated  EYES: bilateral abduction   ENT: Moist mucous membranes  NECK: Supple, no JVD  HEART: Regular rate and rhythm, no murmurs, rubs, or gallops  LUNGS: intubated   ABDOMEN: Soft, nontender, nondistended, +BS  NERVOUS SYSTEM:  intubated, left ext weakness ( did not squeeze)  SKIN: No rashes or lesions

## 2023-01-12 NOTE — H&P ADULT - HISTORY OF PRESENT ILLNESS
63 yo male kth DM, previous TIA, CVA, HTN DLD presented for weakness.   As per his daughter, he was not feeling well and he was weak. He asked them to bring him to the hospital 63 yo male kth DM, previous TIA, CVA,covid last month,  HTN DLD presented for weakness.   As per his daughter, he was not feeling well and he was weak. He asked them to bring him to the hospital.   Here he was altered, had left sided weakness, code stroke was called and the patient was given tpa.   During the ct imaging, he vomitted and possibly aspirated, so he was intubated in order to protect his airways.   As per the daughter he was complaining of dry cough and he was prescribed anti tussive medications, but no other complains   The patient will be admitted to the ICU

## 2023-01-12 NOTE — ED PROCEDURE NOTE - CPROC ED NUMBER OF ATTEMPTS1
1 [Erythema] : erythema [Bulging] : bulging [Anterior Cervical] : anterior cervical [Capillary Refill <2s] : capillary refill < 2s [NL] : warm [Clear Rhinorrhea] : clear rhinorrhea [de-identified] : shotty anterior LAD

## 2023-01-12 NOTE — ED ADULT NURSE NOTE - PRO INTERPRETER NEED 2
English Otezla Pregnancy And Lactation Text: This medication is Pregnancy Category C and it isn't known if it is safe during pregnancy. It is unknown if it is excreted in breast milk.

## 2023-01-12 NOTE — PATIENT PROFILE ADULT - FALL HARM RISK - HARM RISK INTERVENTIONS

## 2023-01-12 NOTE — CHART NOTE - NSCHARTNOTEFT_GEN_A_CORE
Brief Telestroke Note:    Pt is a 63 yo M who presents to [facility] with aphasia and generalized weakness. LKW 0730 as per wife who saw and spoke to him this morning. NIHSS 28 per ED provider. CT Head without acute process. Pt was intubated for airway protection, BP control. TNK administered at 1052 by ED physician. Pt going back to scanner for CTA/CTP.     If no ELVO, please admit to ICU. Please use post IV thrombolytic stroke orderset  Neuro and vitals checks as per post thrombolytic protocol  Maintain BP <180/105 mmHg  SCD for DVT ppx   HOld all ATP/AC x 24 hrs until 24 hr post TNK CT head obtained and confirmed negative for ICH  MRI brain, TTE, tele monitoring  STAT CT head for any sudden neuro decline Brief Telestroke Note:    Pt is a 63 yo M who presents to [facility] with aphasia and generalized weakness. LKW 0730 as per wife who saw and spoke to him this morning. NIHSS 28 per ED provider. CT Head without acute process. Pt was intubated for airway protection, BP control. TNK administered at 1057 by ED physician. Pt going back to scanner for CTA/CTP.     If no ELVO, please admit to ICU. Please use post IV thrombolytic stroke orderset  Neuro and vitals checks as per post thrombolytic protocol  Maintain BP <180/105 mmHg  SCD for DVT ppx   HOld all ATP/AC x 24 hrs until 24 hr post TNK CT head obtained and confirmed negative for ICH  MRI brain, TTE, tele monitoring  STAT CT head for any sudden neuro decline

## 2023-01-12 NOTE — ED PROVIDER NOTE - NIH STROKE SCALE: 10. DYSARTHRIA, QM
(UN) Intubated or other physical barrier (2) Severe dysarthria; patients speech is so slurred as to be unintelligible in the absence of or out of proportion to any dysphasia, or is mute/anarthric

## 2023-01-12 NOTE — ED ADULT TRIAGE NOTE - CHIEF COMPLAINT QUOTE
Patient called in as EMS notification for slurred speech and generalized weakness starting at 7:30am

## 2023-01-12 NOTE — ED ADULT NURSE NOTE - NSIMPLEMENTINTERV_GEN_ALL_ED
Implemented All Fall with Harm Risk Interventions:  [place] to call system. Call bell, personal items and telephone within reach. Instruct patient to call for assistance. Room bathroom lighting operational. Non-slip footwear when patient is off stretcher. Physically safe environment: no spills, clutter or unnecessary equipment. Stretcher in lowest position, wheels locked, appropriate side rails in place. Provide visual cue, wrist band, yellow gown, etc. Monitor gait and stability. Monitor for mental status changes and reorient to person, place, and time. Review medications for side effects contributing to fall risk. Reinforce activity limits and safety measures with patient and family. Provide visual clues: red socks.

## 2023-01-12 NOTE — ED PROVIDER NOTE - PROGRESS NOTE DETAILS
After extensive discussion with patient's wife and daughter regarding the risks and benefits of IV thrombolytics, decision made to administer TNKase, after blood pressure control achieved TNKase pushed by myself at 10:57 AM patient returned from CT, had transient relative hypotension responded to fluids and drips being dc'd followed by transient hypertension responded to cardene; physician in catscan with patient for entire angio

## 2023-01-12 NOTE — H&P ADULT - ASSESSMENT
IMPRESSION:  possible cva sp tpa    PLAN:    CNS: intubated on precedex, sp tpa, needs neurological assessment q 15 minutes for 1 hour then q30 for 2 hours then q 1 hour, if any change then ct of the head, there is a ct h pending now     HEENT: Oral care, HOB at 45,     PULMONARY: intubated, no evidence of acute pathology    CARDIOVASCULAR:Continue pressors, ECHO, keep sbp 160-180 , lipid panel, a1c     GI: keep npo     RENAL: I=0, monitor electrolytes and replete as needed,    INFECTIOUS DISEASE: no evidence of acute infection     HEMATOLOGICAL:  hold ap, ac due to tpa     ENDOCRINE:  Follow up FS.  Insulin protocol if needed.     MUSCULOSKELETAL: Pt/rehab               IMPRESSION:  possible cva sp tpa    PLAN:    CNS: intubated on precedex, sp tpa, needs neurological assessment q 15 minutes for 2 hour then q30 for 2 hours then q 1 hour for 16 hours, if any change then ct of the head, there is a ct h pending now     HEENT: Oral care, HOB at 45,     PULMONARY: intubated, no evidence of acute pathology    CARDIOVASCULAR:Continue pressors, ECHO, keep sbp 160-180 , lipid panel, a1c     GI: keep npo     RENAL: I=0, monitor electrolytes and replete as needed,    INFECTIOUS DISEASE: no evidence of acute infection     HEMATOLOGICAL:  hold ap, ac due to tpa     ENDOCRINE:  Follow up FS.  Insulin protocol if needed.     MUSCULOSKELETAL: Pt/rehab

## 2023-01-12 NOTE — H&P ADULT - NSHPLABSRESULTS_GEN_ALL_CORE
LABS:                          13.8   10.47 )-----------( 226      ( 12 Jan 2023 10:30 )             41.2     Hb Trend: 13.8<--  WBC Trend: 10.47<--  Plt Trend: 226<--    ABG - ( 12 Jan 2023 10:50 )  pH, Arterial: 7.37  pH, Blood: x     /  pCO2: 48    /  pO2: 120   / HCO3: 28    / Base Excess: 1.6   /  SaO2: 99.9                  01-12    140  |  101  |  30<H>  ----------------------------<  195<H>  4.2   |  29  |  1.4    Ca    9.8      12 Jan 2023 10:30    TPro  7.1  /  Alb  4.6  /  TBili  0.5  /  DBili  x   /  AST  25  /  ALT  34  /  AlkPhos  56  01-12    Troponin T, Serum: <0.01 ng/mL (01-12-23 @ 10:30)    PT/INR - ( 12 Jan 2023 10:30 )   PT: 12.30 sec;   INR: 1.08 ratio         PTT - ( 12 Jan 2023 10:30 )  PTT:29.0 sec    CAPILLARY BLOOD GLUCOSE  177 (12 Jan 2023 10:00)      POCT Blood Glucose.: 177 mg/dL (12 Jan 2023 10:04)          IMAGING:

## 2023-01-12 NOTE — ED PROVIDER NOTE - CRITICAL CARE ATTENDING CONTRIBUTION TO CARE
SEEN WITH PA  62-year-old male above past medical history brought in by EMS as prenotification for possible stroke, per EMS patient last known well 930, had slurred speech and left-sided weakness, had deteriorating mental status and ability to speak in route, on discussion with wife patient's last known well was 730 this morning, walking and talking normally, at around 9 AM patient called the wife to say that he was feeling weak in his legs and did not feel right and ambulance was called, patient had recent flu and COVID, no falls or other issues, on arrival patient somnolent but arousable to pain, vital signs as above (initial blood pressure 150/109), blinks his eyes and will weakly grasp my fingers, initially had gag reflex, cor regular, lungs CTA, abdomen soft nontender, patient taken emergently to CAT scan, vomited once and had a GCS of 4 upon return from CAT scan, after discussion with wife patient intubated for protection, blood pressure subsequently 230s over 120s, controlled with labetalol and Cardene as well as sedation, again after discussion with family decision made to administer TN K, given 1057, patient return to CAT scan for angio, no LVO, for admission [facility] (see progress notes).

## 2023-01-13 LAB
ALBUMIN SERPL ELPH-MCNC: 4.2 G/DL — SIGNIFICANT CHANGE UP (ref 3.5–5.2)
ALP SERPL-CCNC: 60 U/L — SIGNIFICANT CHANGE UP (ref 30–115)
ALT FLD-CCNC: 32 U/L — SIGNIFICANT CHANGE UP (ref 0–41)
ANION GAP SERPL CALC-SCNC: 18 MMOL/L — HIGH (ref 7–14)
APPEARANCE UR: CLEAR — SIGNIFICANT CHANGE UP
AST SERPL-CCNC: 29 U/L — SIGNIFICANT CHANGE UP (ref 0–41)
BILIRUB SERPL-MCNC: 0.6 MG/DL — SIGNIFICANT CHANGE UP (ref 0.2–1.2)
BILIRUB UR-MCNC: ABNORMAL
BUN SERPL-MCNC: 21 MG/DL — HIGH (ref 10–20)
CALCIUM SERPL-MCNC: 8.8 MG/DL — SIGNIFICANT CHANGE UP (ref 8.4–10.5)
CHLORIDE SERPL-SCNC: 104 MMOL/L — SIGNIFICANT CHANGE UP (ref 98–110)
CO2 SERPL-SCNC: 20 MMOL/L — SIGNIFICANT CHANGE UP (ref 17–32)
COLOR SPEC: YELLOW — SIGNIFICANT CHANGE UP
CREAT SERPL-MCNC: 1.3 MG/DL — SIGNIFICANT CHANGE UP (ref 0.7–1.5)
DIFF PNL FLD: ABNORMAL
EGFR: 62 ML/MIN/1.73M2 — SIGNIFICANT CHANGE UP
GAS PNL BLDA: SIGNIFICANT CHANGE UP
GAS PNL BLDA: SIGNIFICANT CHANGE UP
GLUCOSE BLDC GLUCOMTR-MCNC: 134 MG/DL — HIGH (ref 70–99)
GLUCOSE BLDC GLUCOMTR-MCNC: 168 MG/DL — HIGH (ref 70–99)
GLUCOSE BLDC GLUCOMTR-MCNC: 175 MG/DL — HIGH (ref 70–99)
GLUCOSE BLDC GLUCOMTR-MCNC: 183 MG/DL — HIGH (ref 70–99)
GLUCOSE BLDC GLUCOMTR-MCNC: 185 MG/DL — HIGH (ref 70–99)
GLUCOSE SERPL-MCNC: 210 MG/DL — HIGH (ref 70–99)
GLUCOSE UR QL: >=1000 MG/DL
HCT VFR BLD CALC: 43.8 % — SIGNIFICANT CHANGE UP (ref 42–52)
HGB BLD-MCNC: 14.5 G/DL — SIGNIFICANT CHANGE UP (ref 14–18)
KETONES UR-MCNC: ABNORMAL
LEUKOCYTE ESTERASE UR-ACNC: NEGATIVE — SIGNIFICANT CHANGE UP
MCHC RBC-ENTMCNC: 29.8 PG — SIGNIFICANT CHANGE UP (ref 27–31)
MCHC RBC-ENTMCNC: 33.1 G/DL — SIGNIFICANT CHANGE UP (ref 32–37)
MCV RBC AUTO: 89.9 FL — SIGNIFICANT CHANGE UP (ref 80–94)
NITRITE UR-MCNC: NEGATIVE — SIGNIFICANT CHANGE UP
NRBC # BLD: 0 /100 WBCS — SIGNIFICANT CHANGE UP (ref 0–0)
PH UR: 6 — SIGNIFICANT CHANGE UP (ref 5–8)
PLATELET # BLD AUTO: 244 K/UL — SIGNIFICANT CHANGE UP (ref 130–400)
POTASSIUM SERPL-MCNC: 4.6 MMOL/L — SIGNIFICANT CHANGE UP (ref 3.5–5)
POTASSIUM SERPL-SCNC: 4.6 MMOL/L — SIGNIFICANT CHANGE UP (ref 3.5–5)
PROT SERPL-MCNC: 6.7 G/DL — SIGNIFICANT CHANGE UP (ref 6–8)
PROT UR-MCNC: 100 MG/DL
RBC # BLD: 4.87 M/UL — SIGNIFICANT CHANGE UP (ref 4.7–6.1)
RBC # FLD: 14.8 % — HIGH (ref 11.5–14.5)
SODIUM SERPL-SCNC: 142 MMOL/L — SIGNIFICANT CHANGE UP (ref 135–146)
SP GR SPEC: >=1.03 (ref 1.01–1.03)
UROBILINOGEN FLD QL: 0.2 MG/DL — SIGNIFICANT CHANGE UP
WBC # BLD: 14.36 K/UL — HIGH (ref 4.8–10.8)
WBC # FLD AUTO: 14.36 K/UL — HIGH (ref 4.8–10.8)

## 2023-01-13 PROCEDURE — 99291 CRITICAL CARE FIRST HOUR: CPT

## 2023-01-13 PROCEDURE — 71045 X-RAY EXAM CHEST 1 VIEW: CPT | Mod: 26

## 2023-01-13 PROCEDURE — 70450 CT HEAD/BRAIN W/O DYE: CPT | Mod: 26

## 2023-01-13 PROCEDURE — 93306 TTE W/DOPPLER COMPLETE: CPT | Mod: 26

## 2023-01-13 RX ORDER — ACETAMINOPHEN 500 MG
650 TABLET ORAL ONCE
Refills: 0 | Status: COMPLETED | OUTPATIENT
Start: 2023-01-13 | End: 2023-01-13

## 2023-01-13 RX ORDER — CEFTRIAXONE 500 MG/1
1000 INJECTION, POWDER, FOR SOLUTION INTRAMUSCULAR; INTRAVENOUS EVERY 24 HOURS
Refills: 0 | Status: DISCONTINUED | OUTPATIENT
Start: 2023-01-13 | End: 2023-01-13

## 2023-01-13 RX ORDER — AMPICILLIN SODIUM AND SULBACTAM SODIUM 250; 125 MG/ML; MG/ML
INJECTION, POWDER, FOR SUSPENSION INTRAMUSCULAR; INTRAVENOUS
Refills: 0 | Status: DISCONTINUED | OUTPATIENT
Start: 2023-01-13 | End: 2023-01-15

## 2023-01-13 RX ORDER — AMPICILLIN SODIUM AND SULBACTAM SODIUM 250; 125 MG/ML; MG/ML
1.5 INJECTION, POWDER, FOR SUSPENSION INTRAMUSCULAR; INTRAVENOUS EVERY 6 HOURS
Refills: 0 | Status: DISCONTINUED | OUTPATIENT
Start: 2023-01-13 | End: 2023-01-15

## 2023-01-13 RX ORDER — ONDANSETRON 8 MG/1
4 TABLET, FILM COATED ORAL ONCE
Refills: 0 | Status: COMPLETED | OUTPATIENT
Start: 2023-01-13 | End: 2023-01-13

## 2023-01-13 RX ORDER — CHLORHEXIDINE GLUCONATE 213 G/1000ML
1 SOLUTION TOPICAL DAILY
Refills: 0 | Status: DISCONTINUED | OUTPATIENT
Start: 2023-01-13 | End: 2023-01-15

## 2023-01-13 RX ORDER — ATORVASTATIN CALCIUM 80 MG/1
80 TABLET, FILM COATED ORAL AT BEDTIME
Refills: 0 | Status: DISCONTINUED | OUTPATIENT
Start: 2023-01-13 | End: 2023-01-15

## 2023-01-13 RX ORDER — METOCLOPRAMIDE HCL 10 MG
10 TABLET ORAL ONCE
Refills: 0 | Status: COMPLETED | OUTPATIENT
Start: 2023-01-13 | End: 2023-01-13

## 2023-01-13 RX ORDER — AMPICILLIN SODIUM AND SULBACTAM SODIUM 250; 125 MG/ML; MG/ML
1.5 INJECTION, POWDER, FOR SUSPENSION INTRAMUSCULAR; INTRAVENOUS ONCE
Refills: 0 | Status: COMPLETED | OUTPATIENT
Start: 2023-01-13 | End: 2023-01-13

## 2023-01-13 RX ADMIN — PANTOPRAZOLE SODIUM 40 MILLIGRAM(S): 20 TABLET, DELAYED RELEASE ORAL at 11:09

## 2023-01-13 RX ADMIN — Medication 10 MILLIGRAM(S): at 15:51

## 2023-01-13 RX ADMIN — Medication 9.75 MICROGRAM(S)/KG/MIN: at 20:03

## 2023-01-13 RX ADMIN — Medication 9.75 MICROGRAM(S)/KG/MIN: at 14:24

## 2023-01-13 RX ADMIN — AMPICILLIN SODIUM AND SULBACTAM SODIUM 100 GRAM(S): 250; 125 INJECTION, POWDER, FOR SUSPENSION INTRAMUSCULAR; INTRAVENOUS at 20:03

## 2023-01-13 RX ADMIN — AMPICILLIN SODIUM AND SULBACTAM SODIUM 100 GRAM(S): 250; 125 INJECTION, POWDER, FOR SUSPENSION INTRAMUSCULAR; INTRAVENOUS at 15:59

## 2023-01-13 RX ADMIN — ONDANSETRON 4 MILLIGRAM(S): 8 TABLET, FILM COATED ORAL at 13:05

## 2023-01-13 RX ADMIN — DEXMEDETOMIDINE HYDROCHLORIDE IN 0.9% SODIUM CHLORIDE 5.2 MICROGRAM(S)/KG/HR: 4 INJECTION INTRAVENOUS at 01:16

## 2023-01-13 RX ADMIN — Medication 9.75 MICROGRAM(S)/KG/MIN: at 01:49

## 2023-01-13 RX ADMIN — DEXMEDETOMIDINE HYDROCHLORIDE IN 0.9% SODIUM CHLORIDE 5.2 MICROGRAM(S)/KG/HR: 4 INJECTION INTRAVENOUS at 05:31

## 2023-01-13 RX ADMIN — CHLORHEXIDINE GLUCONATE 1 APPLICATION(S): 213 SOLUTION TOPICAL at 11:09

## 2023-01-13 RX ADMIN — Medication 10 MILLIGRAM(S): at 13:02

## 2023-01-13 RX ADMIN — FENTANYL CITRATE 5.2 MICROGRAM(S)/KG/HR: 50 INJECTION INTRAVENOUS at 11:09

## 2023-01-13 NOTE — PROGRESS NOTE ADULT - ATTENDING COMMENTS
tom macario examined this morning at approx 4;30 am  d/w pts overnight nurse and RN Kevin  pt was still intubated at that time  clinically stable although still  pn small dose of levophed   d/w pts family and dtr and staff and resident

## 2023-01-13 NOTE — PROVIDER CONTACT NOTE (OTHER) - ACTION/TREATMENT ORDERED:
zofran given but pt vomited very dark bilious fluid about 1330, then 2 more times, family insisted pt try ice chips for nausea Dr Dumont aware

## 2023-01-13 NOTE — CONSULT NOTE ADULT - ASSESSMENT
IMPRESSION:\  acute resp failure 2nd   stroke post TPA         PLAN:    CNS: ct scan around noon   follow neurology recommendation   do SAT    HEENT: oral care     PULMONARY: increase rate 20   will try SBT when he wake up     CARDIOVASCULAR: do [place]   taper pressors to keep SBP around 130-140 follow neurology recommendation   avoid hypotension   echo   statin   start asa if repeat ct scan no bleed as per stroke protocol follow neurology   GI: GI prophylaxis.  OG feed if failed SBT     RENAL: monitor is and os   follow lytes     INFECTIOUS DISEASE: follow cx   check procal     HEMATOLOGICAL:  DVT prophylaxis. heparin SQ 24 hrs after tpa if ct brain negative     ENDOCRINE:  Follow up FS.  Insulin protocol if needed.    MUSCULOSKELETAL:    icu     CRITICAL CARE TIME SPENT: ***

## 2023-01-13 NOTE — PROGRESS NOTE ADULT - SUBJECTIVE AND OBJECTIVE BOX
Patient is a 62y old  Male who presents with a chief complaint of weakness (13 Jan 2023 07:57)      INTERVAL HPI/OVERNIGHT EVENTS:   No overnight events   Afebrile, hemodynamically stable     ICU Vital Signs Last 24 Hrs  T(C): 37.8 (13 Jan 2023 07:05), Max: 37.8 (13 Jan 2023 07:05)  T(F): 100 (13 Jan 2023 07:05), Max: 100 (13 Jan 2023 07:05)  HR: 97 (13 Jan 2023 08:23) (72 - 110)  BP: 156/76 (13 Jan 2023 08:23) (112/63 - 210/74)  BP(mean): 109 (13 Jan 2023 08:23) (94 - 130)  ABP: --  ABP(mean): --  RR: 18 (13 Jan 2023 08:23) (16 - 19)  SpO2: 98% (13 Jan 2023 08:23) (97% - 100%)    O2 Parameters below as of 13 Jan 2023 07:05  Patient On (Oxygen Delivery Method): ventilator          I&O's Summary    12 Jan 2023 07:01  -  13 Jan 2023 07:00  --------------------------------------------------------  IN: 1609.3 mL / OUT: 3340 mL / NET: -1730.7 mL    13 Jan 2023 07:01  -  13 Jan 2023 09:25  --------------------------------------------------------  IN: 28 mL / OUT: 225 mL / NET: -197 mL      Mode: AC/ CMV (Assist Control/ Continuous Mandatory Ventilation)  RR (machine): 16  TV (machine): 450  FiO2: 40  PEEP: 5  MAP: 8  PIP: 16      LABS:                        14.5   14.36 )-----------( 244      ( 13 Jan 2023 06:30 )             43.8     01-13    142  |  104  |  21<H>  ----------------------------<  210<H>  4.6   |  20  |  1.3    Ca    8.8      13 Jan 2023 06:30    TPro  6.7  /  Alb  4.2  /  TBili  0.6  /  DBili  x   /  AST  29  /  ALT  32  /  AlkPhos  60  01-13    PT/INR - ( 12 Jan 2023 10:30 )   PT: 12.30 sec;   INR: 1.08 ratio         PTT - ( 12 Jan 2023 10:30 )  PTT:29.0 sec    CAPILLARY BLOOD GLUCOSE  177 (12 Jan 2023 10:00)      POCT Blood Glucose.: 185 mg/dL (13 Jan 2023 03:16)  POCT Blood Glucose.: 179 mg/dL (12 Jan 2023 23:44)  POCT Blood Glucose.: 191 mg/dL (12 Jan 2023 19:17)  POCT Blood Glucose.: 177 mg/dL (12 Jan 2023 10:04)    ABG - ( 13 Jan 2023 03:42 )  pH, Arterial: 7.33  pH, Blood: x     /  pCO2: 52    /  pO2: 131   / HCO3: 27    / Base Excess: 0.5   /  SaO2: 99.6                RADIOLOGY & ADDITIONAL TESTS:    Consultant(s) Notes Reviewed:  [x ] YES  [ ] NO    MEDICATIONS  (STANDING):  atorvastatin 80 milliGRAM(s) Oral at bedtime  chlorhexidine 2% Cloths 1 Application(s) Topical daily  dexMEDEtomidine Infusion 0.2 MICROgram(s)/kG/Hr (5.2 mL/Hr) IV Continuous <Continuous>  fentaNYL   Infusion. 0.5 MICROgram(s)/kG/Hr (5.2 mL/Hr) IV Continuous <Continuous>  norepinephrine Infusion 0.05 MICROgram(s)/kG/Min (9.75 mL/Hr) IV Continuous <Continuous>  pantoprazole  Injectable 40 milliGRAM(s) IV Push daily  propofol Infusion 5 MICROgram(s)/kG/Min (3.12 mL/Hr) IV Continuous <Continuous>    MEDICATIONS  (PRN):      PHYSICAL EXAM:    General:  on sedation     HEENT: Pupils equal, reactive to light.  Symmetric.    PULM: Clear to auscultation bilaterally, no significant sputum production    CVS: Regular rate and rhythm, no murmurs, rubs, or gallops    ABD: Soft, nondistended, nontender, normoactive bowel sounds, no masses    EXT: No edema, nontender, no clubbing     SKIN: Warm and well perfused, no rashes noted.    Neurology : no motor or sensory deficit     Musculoskeletal :sedated    Lymphatic system: no Palpable node     Care Discussed with Consultants/Other Providers [ x] YES  [ ] NO

## 2023-01-13 NOTE — CONSULT NOTE ADULT - SUBJECTIVE AND OBJECTIVE BOX
BENTON AHMADI     62y     Male    [id number]                                                           CC:Patient is a 62y old  Male who presents with a chief complaint of weakness (13 Jan 2023 09:24)      HPI:  63 yo male kth DM, previous TIA, CVA,covid last month,  HTN DLD presented for weakness.   As per his daughter, he was not feeling well and he was weak. He asked them to bring him to the hospital.   Here he was altered, had left sided weakness, code stroke was called and the patient was given tpa.   During the ct imaging, he vomitted and possibly aspirated, so he was intubated in order to protect his airways.   As per the daughter he was complaining of dry cough and he was prescribed anti tussive medications, but no other complains   The patient will be admitted to the ICU   (12 Jan 2023 15:39)      ROS:  Constitutional, Neurological, Psychiatric, Eyes, ENT, Cardiovascular, Respiratory, Gastrointestinal, Genitourinary, Musculoskeletal, Integumentary, Endocrine and Heme/Lymph are otherwise negative. Except for noted above    Social History: No smoking, No drinking, No drug use    FAMILY HISTORY:  Family history of hypertension (Mother)    Family history of diabetes mellitus (DM) (Mother)          Vital Signs Last 24 Hrs  T(C): 37.8 (13 Jan 2023 07:05), Max: 37.8 (13 Jan 2023 07:05)  T(F): 100 (13 Jan 2023 07:05), Max: 100 (13 Jan 2023 07:05)  HR: 92 (13 Jan 2023 09:18) (72 - 110)  BP: 176/86 (13 Jan 2023 09:18) (112/63 - 188/64)  BP(mean): 123 (13 Jan 2023 09:18) (94 - 130)  RR: 18 (13 Jan 2023 09:18) (16 - 19)  SpO2: 98% (13 Jan 2023 09:18) (97% - 100%)    Parameters below as of 13 Jan 2023 07:05  Patient On (Oxygen Delivery Method): ventilator        Physical Exam:  Constitutional: alert and in no acute distress.  Eyes: the sclera and conjunctiva were normal, pupils were equal in size, round, reactive to light, with normal accommodation and extraocular movements were intact.   Back: no costovertebral angle tenderness and no spinal tenderness.      Neuro Exam:  Orientation: oriented to person, oriented to place and oriented to time.   Attention: normal concentrating ability and visual attention was not decreased.   Language: no difficulty naming common objects, no difficulty repeating a phrase, no difficulty writing a sentence, fluency intact, comprehension intact and reading intact.   Fund of knowledge: displays adequate knowledge of personal past history.   Cranial Nerves: visual acuity intact bilaterally, visual fields full to confrontation, pupils equal round and reactive to light, extraocular motion intact, facial sensation intact symmetrically, face symmetrical, hearing was intact bilaterally, tongue and palate midline, head turning and shoulder shrug symmetric and there was no tongue deviation with protrusion.   Motor: muscle tone was normal in all four extremities, muscle strength was normal in all four extremities and normal bulk in all four extremities.   Sensory exam: light touch was intact.   Coordination:. normal gait. balance was intact. there was no past-pointing. no tremor present.   Deep tendon reflexes:   Biceps right 2+. Biceps left 2+.    Triceps right 2+. Triceps left 2+.    Brachioradialis right 2+. Brachioradialis left 2+.    Patella right 2+. Patella left 2+.    Ankle jerk right 2+. Ankle jerk left 2+.   Plantar responses normal on the right, normal on the left.      NIHSS:     Allergies    No Known Allergies    Intolerances       Home Medications:  aspirin 81 mg oral delayed release tablet: 1 tab(s) orally once a day (12 Jan 2023 15:51)  finasteride 5 mg oral tablet: 1 tab(s) orally once a day (12 Jan 2023 15:52)  lisinopril-hydrochlorothiazide 20 mg-25 mg oral tablet: 1 tab(s) orally once a day (12 Jan 2023 15:52)  metFORMIN 500 mg oral tablet: 1 tab(s) orally 2 times a day (12 Jan 2023 15:51)  pioglitazone 30 mg oral tablet: 1 tab(s) orally once a day (12 Jan 2023 15:52)  rosuvastatin 40 mg oral tablet: 1 tab(s) orally once a day (12 Jan 2023 15:53)  tamsulosin 0.4 mg oral capsule: 1 cap(s) orally once a day (30 Jan 2019 19:28)      MEDICATIONS  (STANDING):  atorvastatin 80 milliGRAM(s) Oral at bedtime  chlorhexidine 2% Cloths 1 Application(s) Topical daily  dexMEDEtomidine Infusion 0.2 MICROgram(s)/kG/Hr (5.2 mL/Hr) IV Continuous <Continuous>  fentaNYL   Infusion. 0.5 MICROgram(s)/kG/Hr (5.2 mL/Hr) IV Continuous <Continuous>  norepinephrine Infusion 0.05 MICROgram(s)/kG/Min (9.75 mL/Hr) IV Continuous <Continuous>  pantoprazole  Injectable 40 milliGRAM(s) IV Push daily  propofol Infusion 5 MICROgram(s)/kG/Min (3.12 mL/Hr) IV Continuous <Continuous>    MEDICATIONS  (PRN):      LABS:                        14.5   14.36 )-----------( 244      ( 13 Jan 2023 06:30 )             43.8     01-13    142  |  104  |  21<H>  ----------------------------<  210<H>  4.6   |  20  |  1.3    Ca    8.8      13 Jan 2023 06:30    TPro  6.7  /  Alb  4.2  /  TBili  0.6  /  DBili  x   /  AST  29  /  ALT  32  /  AlkPhos  60  01-13    PT/INR - ( 12 Jan 2023 10:30 )   PT: 12.30 sec;   INR: 1.08 ratio         PTT - ( 12 Jan 2023 10:30 )  PTT:29.0 sec        [facility]   < from: CT Brain Stroke Protocol (01.12.23 @ 10:16) >    ACC: [id number] EXAM:  CT BRAIN STROKE PROTOCOL                          *** ADDENDUM # 1 ***    Clinical indictation should include: Slurred speech and generalized   weakness.    Findings were discussed with Dr. Lynn at 10:56am with readback   confirmation.    --- End of Report ---    *** END OF ADDENDUM # 1 ***      PROCEDURE DATE:  01/12/2023          INTERPRETATION:  CLINICAL INDICATION: Code stroke.    Technique: CT of the head was performed without contrast.    Multiple contiguous axial images were acquired from the skullbase to the   vertex without the administration of intravenous contrast.  Coronal and   sagittal reformations were made.    COMPARISON:  prior head CT dated 1/31/2019.    FINDINGS:    The ventricles and sulci are unremarkable in appearance. Gray-white   differentiation appears well maintained.     There is no intraparenchymal hematoma, mass effect or midline shift. No   abnormal extra-axial fluid collections are present.    The calvarium is intact. The visualized intraorbital compartments,   paranasal sinuses and mastoid complexes appear free of acute disease.   There is polypoid mucosal thickening noted of the bilateral maxillary   sinuses.    IMPRESSION:  No CT evidence of large acute territorial infarct.    No evidence of acute intracranial pathology. No mass effect, midline   shift or intracranial hemorrhage.    Chronic lacunar infarcts involving the left corona radiata as well as the   right thalamus.    A callback request was placed at 10:22am.    --- End of Report ---    ***Please see the addendum at the top of this report. It may contain   additional important information or changes.****    < end of copied text >      < from: CT Head No Cont (01.12.23 @ 16:15) >    PROCEDURE DATE:  01/12/2023          INTERPRETATION:  Clinical History / Reason for exam: strabismus status   post tPA    Technique: Noncontrast head CT.  Contiguous unenhanced CT axial images of   the head from the base to the vertex with coronal and sagittal reformats.    Comparison: CT head 1/12/2023, 10:13 a.m.    Findings:    There is motion artifact.    The ventricles and cortical sulci are stable and within normal limits for   age.    There are stable chronic lacunar infarcts within the left corona radiata   and right thalamus.    There is no acute intracranial hemorrhage, extra-axial fluid collection   or midline shift.  Gray white matter differentiation is maintained.    Vascular calcifications are noted.    Polypoid mucosal thickening is seen within the maxillary sinuses. There   is additional mild scattered paranasal sinus mucosal thickening. The   mastoids are clear.    IMPRESSION:    1.  No evidence of acute intracranial hemorrhage or large territory   infarct. Stable exam since earlier the same day.    2.  Stable chronic lacunar infarcts in the left corona radiata and right   thalamus.    --- End of Report ---    < end of copied text >      < from: CT Brain Perfusion Maps Stroke (01.12.23 @ 11:50) >    ACC: [id number] EXAM:  CT ANGIO BRAIN STROKE PROTC IC                        ACC: [id number] EXAM:  CT ANGIO NECK STROKE PROTCL IC                        ACC: [id number] EXAM:  CT BRAIN PERFUSION MAPS STROKE                          PROCEDURE DATE:  01/12/2023          INTERPRETATION:  CLINICAL INDICATION: Stroke code. Generalized weakness   with slurred speech. .    TECHNIQUE: CT angiography of the intracranial (head) and extracranial   (neck) circulation was performed after contrast administration    Maximal intensity projection images were additionally included and   reviewed.    RAPID PERFUSION images were obtained. Color maps were reviewed.    190 mls of Omnipaque 350 was administered intravenously without   complication and 10 mls were discarded.    Using a separate workstation, 3-D shaded surface reformations were made   of vasculature. 3-D reformations were reviewed and included in   interpretation of the official report.    CAROTID STENOSIS REFERENCE: (NASCET = 100x1-(N/D)). N=greatest narrowing.   D=normal distal diameter - MILD = <50% stenosis. - MODERATE = 50-69%   stenosis. - SEVERE = 70-89% stenosis. - HAIRLINE/CRITICAL = 90-99%   stenosis. - OCCLUDED = 100% stenosis.    COMPARISON: CTA of the head and neck dated 1/30/2019.    FINDINGS:    CT PERFUSION:    There are scattered regions of hypoperfusion using Tmax threshold of 4   seconds totaling 130 MLS 9 times distribution. There is no evidence of   perfusion mismatch.    HEAD CTA:    The internal carotid arteries demonstrate normal enhancement to the   intracranial circulation and [place]. There is atherosclerotic   calcification of the bilateral carotid siphons resulting in mild to   moderate stenosis bilaterally.    There is moderate focal stenosis noted of the distal left M1 segment of   the MCA. Distal MCA branch vessels are patent. The right MCA is patent   without stenosis.    Anterior cerebral arteries demonstrate normal enhancement and symmetric   arborization without intraluminal filling defect, stenosis, occlusion,   aneurysm or vascular malformation.    The intradural vertebral arteries demonstrate normal enhancement to the   vertebrobasilar confluence. Branch vasculature of the posterior   circulation are within normal limits. A segmental stenosis is noted of   the right P3 segment of the PCA, series 304 image 275. The distal PCA   appears patent. The left PCA is patent without stenosis.    Visualized dural venous sinuses and deep cerebral venous structures   demonstrate normal enhancement without evidence for filling defect.    NECK CTA:    The aortic arch and origins of the great vessels are within normal limits.    Right:  The origin of the right common carotid artery is normal. The   right common carotid artery is normal in course and caliber to the   carotid bifurcation. Origins of the internal and external carotid   arteries are normal by NASCET criteria. There is minimal atherosclerotic   calcification involving the proximal right ICA without stenosis. The   right internal carotid artery has normal course and caliber to the   intracranial circulation.    The origin of the right vertebral artery is normal. The right vertebral   artery is normal in course and caliber to the intracranial circulation.    Left: The origin of the left common carotid artery is normal. The left   common carotid artery is normal in course and caliber to the carotid   bifurcation. Origins of the internal and external carotid arteries are   normal by NASCET criteria. There is minimal atherosclerotic calcification   involving the proximal left ICA without stenosis. The left internal   carotid artery has normal course and caliber to the intracranial   circulation.    The origin of the left vertebral artery is normal. The left vertebral   artery is normal in course and caliber to the intracranial circulation.  Endotracheal and enteric tubes are noted, tips not visualized.    IMPRESSION:    CT PERFUSION:  No evidence of perfusion mismatch.    CTA HEAD:  Segmental stenosis is noted of the right P3 segment of the PCA without   evidence of vessel occlusion.    Moderate stenosis of the left M1 segment of the MCA.    Mild to moderate stenoses of the bilateral carotid siphons due to   atherosclerotic calcification.    CTA NECK:  No evidence of carotid or vertebral artery stenosis.    --- End of Report ---    < end of copied text >      EEG:    Echo:  Carotid Doppler:  Telemetry:    Assessment / Plan: This is a 62y year old Male presenting with   1.                  BENTON AHMADI     62y     Male    [id number]                                                           CC:Patient is a 62y old  Male who presents with a chief complaint of weakness (13 Jan 2023 09:24)      HPI:  63 yo male kth DM, previous TIA, CVA,covid last month,  HTN DLD presented for weakness.   As per his daughter, he was not feeling well and he was weak. He asked them to bring him to the hospital.   Here he was altered, had left sided weakness, code stroke was called and the patient was given tpa.   During the ct imaging, he vomitted and possibly aspirated, so he was intubated in order to protect his airways.   As per the daughter he was complaining of dry cough and he was prescribed anti tussive medications, but no other complains   The patient will be admitted to the ICU   (12 Jan 2023 15:39)    Patient was not feeling well at home and felt both his legs were weak and he was vomiting.  Daughter tried to help him up but was unable to get hime to stand because both legs were weak.  They called 911.  He just finished a course of abx and steroid recently for a URI.  He had a bout of bronchitis 3-4 weeks ago and has been sick for the last month.  He was vomiting in the ED so was intubated.  Was given TNK in ED because he was intubated and neuroexam could not be repeated reliably.  He was extubated this morning.  During my evaluation he began vomiting dark bilious liquid.    ROS:  Constitutional, Neurological, Psychiatric, Eyes, ENT, Cardiovascular, Respiratory, Gastrointestinal, Genitourinary, Musculoskeletal, Integumentary, Endocrine and Heme/Lymph are otherwise negative. Except for noted above    Social History: No smoking, No drinking, No drug use    FAMILY HISTORY:  Family history of hypertension (Mother)    Family history of diabetes mellitus (DM) (Mother)          Vital Signs Last 24 Hrs  T(C): 37.8 (13 Jan 2023 07:05), Max: 37.8 (13 Jan 2023 07:05)  T(F): 100 (13 Jan 2023 07:05), Max: 100 (13 Jan 2023 07:05)  HR: 92 (13 Jan 2023 09:18) (72 - 110)  BP: 176/86 (13 Jan 2023 09:18) (112/63 - 188/64)  BP(mean): 123 (13 Jan 2023 09:18) (94 - 130)  RR: 18 (13 Jan 2023 09:18) (16 - 19)  SpO2: 98% (13 Jan 2023 09:18) (97% - 100%)    Parameters below as of 13 Jan 2023 07:05  Patient On (Oxygen Delivery Method): ventilator        Physical Exam:  Constitutional: alert and in no acute distress.  Eyes: the sclera and conjunctiva were normal, pupils were equal in size, round, reactive to light, with normal accommodation and extraocular movements were intact.   Back: no costovertebral angle tenderness and no spinal tenderness.      Neuro Exam:  Drowsy, oriented to name and age.  Voice is low volume (recently extubated). EOMI, PERRL, No facial, tongue midline, Facial sensation symmetric  No drift in UE.  b/l LE drift to bed in under 3 seconds  Sensory symmetric in extremities  FTN NL in UE  BTT in b/l visual fields    NIHSS 4 for b/l LE weakness  mrankin at baseline 0    Allergies    No Known Allergies    Intolerances       Home Medications:  aspirin 81 mg oral delayed release tablet: 1 tab(s) orally once a day (12 Jan 2023 15:51)  finasteride 5 mg oral tablet: 1 tab(s) orally once a day (12 Jan 2023 15:52)  lisinopril-hydrochlorothiazide 20 mg-25 mg oral tablet: 1 tab(s) orally once a day (12 Jan 2023 15:52)  metFORMIN 500 mg oral tablet: 1 tab(s) orally 2 times a day (12 Jan 2023 15:51)  pioglitazone 30 mg oral tablet: 1 tab(s) orally once a day (12 Jan 2023 15:52)  rosuvastatin 40 mg oral tablet: 1 tab(s) orally once a day (12 Jan 2023 15:53)  tamsulosin 0.4 mg oral capsule: 1 cap(s) orally once a day (30 Jan 2019 19:28)      MEDICATIONS  (STANDING):  atorvastatin 80 milliGRAM(s) Oral at bedtime  chlorhexidine 2% Cloths 1 Application(s) Topical daily  dexMEDEtomidine Infusion 0.2 MICROgram(s)/kG/Hr (5.2 mL/Hr) IV Continuous <Continuous>  fentaNYL   Infusion. 0.5 MICROgram(s)/kG/Hr (5.2 mL/Hr) IV Continuous <Continuous>  norepinephrine Infusion 0.05 MICROgram(s)/kG/Min (9.75 mL/Hr) IV Continuous <Continuous>  pantoprazole  Injectable 40 milliGRAM(s) IV Push daily  propofol Infusion 5 MICROgram(s)/kG/Min (3.12 mL/Hr) IV Continuous <Continuous>    MEDICATIONS  (PRN):      LABS:                        14.5   14.36 )-----------( 244      ( 13 Jan 2023 06:30 )             43.8     01-13    142  |  104  |  21<H>  ----------------------------<  210<H>  4.6   |  20  |  1.3    Ca    8.8      13 Jan 2023 06:30    TPro  6.7  /  Alb  4.2  /  TBili  0.6  /  DBili  x   /  AST  29  /  ALT  32  /  AlkPhos  60  01-13    PT/INR - ( 12 Jan 2023 10:30 )   PT: 12.30 sec;   INR: 1.08 ratio         PTT - ( 12 Jan 2023 10:30 )  PTT:29.0 sec        Neuro Imaging:  [facility]:   < from: CT Brain Stroke Protocol (01.12.23 @ 10:16) >    ACC: [id number] EXAM:  CT BRAIN STROKE PROTOCOL                          *** ADDENDUM # 1 ***    Clinical indictation should include: Slurred speech and generalized   weakness.    Findings were discussed with Dr. Lynn at 10:56am with readback   confirmation.    --- End of Report ---    *** END OF ADDENDUM # 1 ***      PROCEDURE DATE:  01/12/2023          INTERPRETATION:  CLINICAL INDICATION: Code stroke.    Technique: CT of the head was performed without contrast.    Multiple contiguous axial images were acquired from the skullbase to the   vertex without the administration of intravenous contrast.  Coronal and   sagittal reformations were made.    COMPARISON:  prior head CT dated 1/31/2019.    FINDINGS:    The ventricles and sulci are unremarkable in appearance. Gray-white   differentiation appears well maintained.     There is no intraparenchymal hematoma, mass effect or midline shift. No   abnormal extra-axial fluid collections are present.    The calvarium is intact. The visualized intraorbital compartments,   paranasal sinuses and mastoid complexes appear free of acute disease.   There is polypoid mucosal thickening noted of the bilateral maxillary   sinuses.    IMPRESSION:  No CT evidence of large acute territorial infarct.    No evidence of acute intracranial pathology. No mass effect, midline   shift or intracranial hemorrhage.    Chronic lacunar infarcts involving the left corona radiata as well as the   right thalamus.    A callback request was placed at 10:22am.    --- End of Report ---    ***Please see the addendum at the top of this report. It may contain   additional important information or changes.****    < end of copied text >      < from: CT Head No Cont (01.12.23 @ 16:15) >    PROCEDURE DATE:  01/12/2023          INTERPRETATION:  Clinical History / Reason for exam: strabismus status   post tPA    Technique: Noncontrast head CT.  Contiguous unenhanced CT axial images of   the head from the base to the vertex with coronal and sagittal reformats.    Comparison: CT head 1/12/2023, 10:13 a.m.    Findings:    There is motion artifact.    The ventricles and cortical sulci are stable and within normal limits for   age.    There are stable chronic lacunar infarcts within the left corona radiata   and right thalamus.    There is no acute intracranial hemorrhage, extra-axial fluid collection   or midline shift.  Gray white matter differentiation is maintained.    Vascular calcifications are noted.    Polypoid mucosal thickening is seen within the maxillary sinuses. There   is additional mild scattered paranasal sinus mucosal thickening. The   mastoids are clear.    IMPRESSION:    1.  No evidence of acute intracranial hemorrhage or large territory   infarct. Stable exam since earlier the same day.    2.  Stable chronic lacunar infarcts in the left corona radiata and right   thalamus.    --- End of Report ---    < end of copied text >      < from: CT Brain Perfusion Maps Stroke (01.12.23 @ 11:50) >    ACC: [id number] EXAM:  CT ANGIO BRAIN STROKE [facility] IC                        ACC: [id number] EXAM:  CT ANGIO NECK STROKE PROT IC                        ACC: [id number] EXAM:  CT BRAIN PERFUSION MAPS STROKE                          PROCEDURE DATE:  01/12/2023          INTERPRETATION:  CLINICAL INDICATION: Stroke code. Generalized weakness   with slurred speech. .    TECHNIQUE: CT angiography of the intracranial (head) and extracranial   (neck) circulation was performed after contrast administration    Maximal intensity projection images were additionally included and   reviewed.    RAPID PERFUSION images were obtained. Color maps were reviewed.    190 mls of Omnipaque 350 was administered intravenously without   complication and 10 mls were discarded.    Using a separate workstation, 3-D shaded surface reformations were made   of vasculature. 3-D reformations were reviewed and included in   interpretation of the official report.    CAROTID STENOSIS REFERENCE: (NASCET = 100x1-(N/D)). N=greatest narrowing.   D=normal distal diameter - MILD = <50% stenosis. - MODERATE = 50-69%   stenosis. - SEVERE = 70-89% stenosis. - HAIRLINE/CRITICAL = 90-99%   stenosis. - OCCLUDED = 100% stenosis.    COMPARISON: CTA of the head and neck dated 1/30/2019.    FINDINGS:    CT PERFUSION:    There are scattered regions of hypoperfusion using Tmax threshold of 4   seconds totaling 130 MLS 9 times distribution. There is no evidence of   perfusion mismatch.    HEAD CTA:    The internal carotid arteries demonstrate normal enhancement to the   intracranial circulation and [place]. There is atherosclerotic   calcification of the bilateral carotid siphons resulting in mild to   moderate stenosis bilaterally.    There is moderate focal stenosis noted of the distal left M1 segment of   the MCA. Distal MCA branch vessels are patent. The right MCA is patent   without stenosis.    Anterior cerebral arteries demonstrate normal enhancement and symmetric   arborization without intraluminal filling defect, stenosis, occlusion,   aneurysm or vascular malformation.    The intradural vertebral arteries demonstrate normal enhancement to the   vertebrobasilar confluence. Branch vasculature of the posterior   circulation are within normal limits. A segmental stenosis is noted of   the right P3 segment of the PCA, series 304 image 275. The distal PCA   appears patent. The left PCA is patent without stenosis.    Visualized dural venous sinuses and deep cerebral venous structures   demonstrate normal enhancement without evidence for filling defect.    NECK CTA:    The aortic arch and origins of the great vessels are within normal limits.    Right:  The origin of the right common carotid artery is normal. The   right common carotid artery is normal in course and caliber to the   carotid bifurcation. Origins of the internal and external carotid   arteries are normal by NASCET criteria. There is minimal atherosclerotic   calcification involving the proximal right ICA without stenosis. The   right internal carotid artery has normal course and caliber to the   intracranial circulation.    The origin of the right vertebral artery is normal. The right vertebral   artery is normal in course and caliber to the intracranial circulation.    Left: The origin of the left common carotid artery is normal. The left   common carotid artery is normal in course and caliber to the carotid   bifurcation. Origins of the internal and external carotid arteries are   normal by NASCET criteria. There is minimal atherosclerotic calcification   involving the proximal left ICA without stenosis. The left internal   carotid artery has normal course and caliber to the intracranial   circulation.    The origin of the left vertebral artery is normal. The left vertebral   artery is normal in course and caliber to the intracranial circulation.  Endotracheal and enteric tubes are noted, tips not visualized.    IMPRESSION:    CT PERFUSION:  No evidence of perfusion mismatch.    CTA HEAD:  Segmental stenosis is noted of the right P3 segment of the PCA without   evidence of vessel occlusion.    Moderate stenosis of the left M1 segment of the MCA.    Mild to moderate stenoses of the bilateral carotid siphons due to   atherosclerotic calcification.    CTA NECK:  No evidence of carotid or vertebral artery stenosis.    --- End of Report ---    < end of copied text >      < from: CT Head No Cont (01.13.23 @ 13:29) >  PROCEDURE DATE:  01/13/2023          INTERPRETATION:  CLINICAL INDICATION: Status post TPA. Stroke.    Technique: CT of the head was performed without contrast.    Multiple contiguous axial images were acquired from the skullbase to the   vertex without the administration of intravenous contrast.  Coronal and   sagittal reformations were made.    COMPARISON:  prior head CT dated 1/12/2023    FINDINGS:    The ventricles and sulci are unremarkable in appearance. Gray-white   differentiation appears well maintained.    Chronic lacunar infarcts involving the left basal ganglia as well as the   right thalamus. There is no intraparenchymal hematoma, mass effect or   midline shift. No abnormalextra-axial fluid collections are present.    The calvarium is intact. The visualized intraorbital compartments,   paranasal sinuses and mastoid complexes appear free of acute disease.   There is polypoid mucosal thickening noted of the bilateral maxillary   sinuses.    IMPRESSION:  No evidence of acute intracranial pathology. No mass effect, midline   shift or intracranial hemorrhage.    Chronic lacunar infarcts involving the left corona radiata as well as the   right thalamus.    --- End of Report ---    < end of copied text >          Assessment / Plan: This is a 62y year old Male presenting with vomiting and diffuse weakness, intubated for airway protection and given TNK for possible stroke.  Repeat CTH donot show bleeding or a new stroke only chronic infarcts.  HE continues to have vomiting and slight increase in his WBC.  Has basilar opacity in the lung.  Unclear if patient had a stroke or may have been a GI problem and or other underlying infection  1. Neurochecks and vitals q4 hours  2. MRI brain w/o DEEDEE (check for an aborted stroke)  3. Trend CBC and Temp  4. Consider further medical workup for infection or GI etiologies  5. OK to start aspirin 81mg QD

## 2023-01-13 NOTE — CONSULT NOTE ADULT - SUBJECTIVE AND OBJECTIVE BOX
Patient is a 62y old  Male who presents with a chief complaint of weakness (12 Jan 2023 15:39)      HPI:  63 yo male kth DM, previous TIA, CVA,covid last month,  HTN DLD presented for weakness.   As per his daughter, he was not feeling well and he was weak. He asked them to bring him to the hospital.   Here he was altered, had left sided weakness, code stroke was called and the patient was given tpa.   During the ct imaging, he vomitted and possibly aspirated, so he was intubated in order to protect his airways.   As per the daughter he was complaining of dry cough and he was prescribed anti tussive medications, but no other complains   The patient will be admitted to the ICU   (12 Jan 2023 15:39)      PAST MEDICAL & SURGICAL HISTORY:  CVA (cerebral vascular accident)      HTN (hypertension)      Diabetes      High cholesterol        Allergies    No Known Allergies    Intolerances      Family history : no cardiovscular family history   Home Medications:  aspirin 81 mg oral delayed release tablet: 1 tab(s) orally once a day (12 Jan 2023 15:51)  finasteride 5 mg oral tablet: 1 tab(s) orally once a day (12 Jan 2023 15:52)  lisinopril-hydrochlorothiazide 20 mg-25 mg oral tablet: 1 tab(s) orally once a day (12 Jan 2023 15:52)  metFORMIN 500 mg oral tablet: 1 tab(s) orally 2 times a day (12 Jan 2023 15:51)  pioglitazone 30 mg oral tablet: 1 tab(s) orally once a day (12 Jan 2023 15:52)  rosuvastatin 40 mg oral tablet: 1 tab(s) orally once a day (12 Jan 2023 15:53)  tamsulosin 0.4 mg oral capsule: 1 cap(s) orally once a day (30 Jan 2019 19:28)    Occupation:  Alochol: Denied  Smoking: Denied  Drug Use: Denied  Marital Status:         ROS: as in HPI; All other systems reviewed are negative    ICU Vital Signs Last 24 Hrs  T(C): 37.8 (13 Jan 2023 07:05), Max: 37.8 (13 Jan 2023 07:05)  T(F): 100 (13 Jan 2023 07:05), Max: 100 (13 Jan 2023 07:05)  HR: 96 (13 Jan 2023 07:05) (72 - 110)  BP: 161/77 (13 Jan 2023 07:05) (112/63 - 210/74)  BP(mean): 110 (13 Jan 2023 07:05) (94 - 130)  ABP: --  ABP(mean): --  RR: 18 (13 Jan 2023 07:05) (16 - 19)  SpO2: 99% (13 Jan 2023 07:05) (97% - 100%)    O2 Parameters below as of 13 Jan 2023 07:05  Patient On (Oxygen Delivery Method): ventilator              Physical Examination:    General:  on sedation     HEENT: Pupils equal, reactive to light.  Symmetric.    PULM: Clear to auscultation bilaterally, no significant sputum production    CVS: Regular rate and rhythm, no murmurs, rubs, or gallops    ABD: Soft, nondistended, nontender, normoactive bowel sounds, no masses    EXT: No edema, nontender, no clubbing     SKIN: Warm and well perfused, no rashes noted.    Neurology : no motor or sensory deficit     Musculoskeletal : move all extremity     Lymphatic system: no Palpable node       Mode: AC/ CMV (Assist Control/ Continuous Mandatory Ventilation)  RR (machine): 16  TV (machine): 450  FiO2: 40  PEEP: 5  MAP: 8  PIP: 16      ABG - ( 13 Jan 2023 03:42 )  pH, Arterial: 7.33  pH, Blood: x     /  pCO2: 52    /  pO2: 131   / HCO3: 27    / Base Excess: 0.5   /  SaO2: 99.6                I&O's Detail    12 Jan 2023 07:01  -  13 Jan 2023 07:00  --------------------------------------------------------  IN:    Dexmedetomidine: 46.8 mL    Dexmedetomidine: 510 mL    FentaNYL: 573 mL    Norepinephrine: 479.5 mL  Total IN: 1609.3 mL    OUT:    Indwelling Catheter - Urethral (mL): 1440 mL    NiCARdipine: 0 mL    Propofol: 0 mL    Voided (mL): 1900 mL  Total OUT: 3340 mL    Total NET: -1730.7 mL      13 Jan 2023 07:01  -  13 Jan 2023 07:58  --------------------------------------------------------  IN:  Total IN: 0 mL    OUT:    Indwelling Catheter - Urethral (mL): 225 mL  Total OUT: 225 mL    Total NET: -225 mL            LABS:                        14.5   14.36 )-----------( 244      ( 13 Jan 2023 06:30 )             43.8     13 Jan 2023 06:30    142    |  104    |  21     ----------------------------<  210    4.6     |  20     |  1.3      Ca    8.8        13 Jan 2023 06:30    TPro  6.7    /  Alb  4.2    /  TBili  0.6    /  DBili  x      /  AST  29     /  ALT  32     /  AlkPhos  60     13 Jan 2023 06:30  Amylase x     lipase x          CARDIAC MARKERS ( 12 Jan 2023 10:30 )  x     / <0.01 ng/mL / x     / x     / x          CAPILLARY BLOOD GLUCOSE  177 (12 Jan 2023 10:00)      POCT Blood Glucose.: 185 mg/dL (13 Jan 2023 03:16)    PT/INR - ( 12 Jan 2023 10:30 )   PT: 12.30 sec;   INR: 1.08 ratio         PTT - ( 12 Jan 2023 10:30 )  PTT:29.0 sec    Culture        MEDICATIONS  (STANDING):  chlorhexidine 2% Cloths 1 Application(s) Topical daily  dexMEDEtomidine Infusion 0.2 MICROgram(s)/kG/Hr (5.2 mL/Hr) IV Continuous <Continuous>  fentaNYL   Infusion. 0.5 MICROgram(s)/kG/Hr (5.2 mL/Hr) IV Continuous <Continuous>  norepinephrine Infusion 0.05 MICROgram(s)/kG/Min (9.75 mL/Hr) IV Continuous <Continuous>  pantoprazole  Injectable 40 milliGRAM(s) IV Push daily  propofol Infusion 5 MICROgram(s)/kG/Min (3.12 mL/Hr) IV Continuous <Continuous>    MEDICATIONS  (PRN):        RADIOLOGY: ***     CXR: small blunting left costophrenic   TLC:  OG:  ET tube:        CAM ICU:  ECHO:

## 2023-01-14 LAB
A1C WITH ESTIMATED AVERAGE GLUCOSE RESULT: 7 % — HIGH (ref 4–5.6)
ALBUMIN SERPL ELPH-MCNC: 3.8 G/DL — SIGNIFICANT CHANGE UP (ref 3.5–5.2)
ALP SERPL-CCNC: 50 U/L — SIGNIFICANT CHANGE UP (ref 30–115)
ALT FLD-CCNC: 24 U/L — SIGNIFICANT CHANGE UP (ref 0–41)
ANION GAP SERPL CALC-SCNC: 11 MMOL/L — SIGNIFICANT CHANGE UP (ref 7–14)
AST SERPL-CCNC: 17 U/L — SIGNIFICANT CHANGE UP (ref 0–41)
BASOPHILS # BLD AUTO: 0.03 K/UL — SIGNIFICANT CHANGE UP (ref 0–0.2)
BASOPHILS NFR BLD AUTO: 0.3 % — SIGNIFICANT CHANGE UP (ref 0–1)
BILIRUB SERPL-MCNC: 0.5 MG/DL — SIGNIFICANT CHANGE UP (ref 0.2–1.2)
BUN SERPL-MCNC: 32 MG/DL — HIGH (ref 10–20)
CALCIUM SERPL-MCNC: 9 MG/DL — SIGNIFICANT CHANGE UP (ref 8.4–10.5)
CHLORIDE SERPL-SCNC: 106 MMOL/L — SIGNIFICANT CHANGE UP (ref 98–110)
CHOLEST SERPL-MCNC: 149 MG/DL — SIGNIFICANT CHANGE UP
CO2 SERPL-SCNC: 28 MMOL/L — SIGNIFICANT CHANGE UP (ref 17–32)
CREAT SERPL-MCNC: 1.4 MG/DL — SIGNIFICANT CHANGE UP (ref 0.7–1.5)
EGFR: 56 ML/MIN/1.73M2 — LOW
EOSINOPHIL # BLD AUTO: 0.02 K/UL — SIGNIFICANT CHANGE UP (ref 0–0.7)
EOSINOPHIL NFR BLD AUTO: 0.2 % — SIGNIFICANT CHANGE UP (ref 0–8)
ESTIMATED AVERAGE GLUCOSE: 154 MG/DL — HIGH (ref 68–114)
GLUCOSE BLDC GLUCOMTR-MCNC: 145 MG/DL — HIGH (ref 70–99)
GLUCOSE BLDC GLUCOMTR-MCNC: 157 MG/DL — HIGH (ref 70–99)
GLUCOSE BLDC GLUCOMTR-MCNC: 169 MG/DL — HIGH (ref 70–99)
GLUCOSE BLDC GLUCOMTR-MCNC: 201 MG/DL — HIGH (ref 70–99)
GLUCOSE SERPL-MCNC: 142 MG/DL — HIGH (ref 70–99)
HCT VFR BLD CALC: 37.6 % — LOW (ref 42–52)
HDLC SERPL-MCNC: 46 MG/DL — SIGNIFICANT CHANGE UP
HGB BLD-MCNC: 12.4 G/DL — LOW (ref 14–18)
IMM GRANULOCYTES NFR BLD AUTO: 0.3 % — SIGNIFICANT CHANGE UP (ref 0.1–0.3)
LIPID PNL WITH DIRECT LDL SERPL: 78 MG/DL — SIGNIFICANT CHANGE UP
LYMPHOCYTES # BLD AUTO: 1.18 K/UL — LOW (ref 1.2–3.4)
LYMPHOCYTES # BLD AUTO: 12.1 % — LOW (ref 20.5–51.1)
MCHC RBC-ENTMCNC: 29.7 PG — SIGNIFICANT CHANGE UP (ref 27–31)
MCHC RBC-ENTMCNC: 33 G/DL — SIGNIFICANT CHANGE UP (ref 32–37)
MCV RBC AUTO: 90 FL — SIGNIFICANT CHANGE UP (ref 80–94)
MONOCYTES # BLD AUTO: 1.17 K/UL — HIGH (ref 0.1–0.6)
MONOCYTES NFR BLD AUTO: 12 % — HIGH (ref 1.7–9.3)
NEUTROPHILS # BLD AUTO: 7.36 K/UL — HIGH (ref 1.4–6.5)
NEUTROPHILS NFR BLD AUTO: 75.1 % — SIGNIFICANT CHANGE UP (ref 42.2–75.2)
NON HDL CHOLESTEROL: 103 MG/DL — SIGNIFICANT CHANGE UP
NRBC # BLD: 0 /100 WBCS — SIGNIFICANT CHANGE UP (ref 0–0)
PLATELET # BLD AUTO: 209 K/UL — SIGNIFICANT CHANGE UP (ref 130–400)
POTASSIUM SERPL-MCNC: 4.1 MMOL/L — SIGNIFICANT CHANGE UP (ref 3.5–5)
POTASSIUM SERPL-SCNC: 4.1 MMOL/L — SIGNIFICANT CHANGE UP (ref 3.5–5)
PROCALCITONIN SERPL-MCNC: 0.35 NG/ML — HIGH (ref 0.02–0.1)
PROT SERPL-MCNC: 6.2 G/DL — SIGNIFICANT CHANGE UP (ref 6–8)
RBC # BLD: 4.18 M/UL — LOW (ref 4.7–6.1)
RBC # FLD: 15.3 % — HIGH (ref 11.5–14.5)
SODIUM SERPL-SCNC: 145 MMOL/L — SIGNIFICANT CHANGE UP (ref 135–146)
TRIGL SERPL-MCNC: 125 MG/DL — SIGNIFICANT CHANGE UP
WBC # BLD: 9.79 K/UL — SIGNIFICANT CHANGE UP (ref 4.8–10.8)
WBC # FLD AUTO: 9.79 K/UL — SIGNIFICANT CHANGE UP (ref 4.8–10.8)

## 2023-01-14 PROCEDURE — 71045 X-RAY EXAM CHEST 1 VIEW: CPT | Mod: 26

## 2023-01-14 PROCEDURE — 99233 SBSQ HOSP IP/OBS HIGH 50: CPT | Mod: GC

## 2023-01-14 PROCEDURE — 99233 SBSQ HOSP IP/OBS HIGH 50: CPT

## 2023-01-14 RX ORDER — ENOXAPARIN SODIUM 100 MG/ML
40 INJECTION SUBCUTANEOUS EVERY 24 HOURS
Refills: 0 | Status: DISCONTINUED | OUTPATIENT
Start: 2023-01-14 | End: 2023-01-15

## 2023-01-14 RX ORDER — ASPIRIN/CALCIUM CARB/MAGNESIUM 324 MG
81 TABLET ORAL DAILY
Refills: 0 | Status: DISCONTINUED | OUTPATIENT
Start: 2023-01-14 | End: 2023-01-15

## 2023-01-14 RX ADMIN — AMPICILLIN SODIUM AND SULBACTAM SODIUM 100 GRAM(S): 250; 125 INJECTION, POWDER, FOR SUSPENSION INTRAMUSCULAR; INTRAVENOUS at 08:07

## 2023-01-14 RX ADMIN — AMPICILLIN SODIUM AND SULBACTAM SODIUM 100 GRAM(S): 250; 125 INJECTION, POWDER, FOR SUSPENSION INTRAMUSCULAR; INTRAVENOUS at 13:48

## 2023-01-14 RX ADMIN — AMPICILLIN SODIUM AND SULBACTAM SODIUM 100 GRAM(S): 250; 125 INJECTION, POWDER, FOR SUSPENSION INTRAMUSCULAR; INTRAVENOUS at 17:41

## 2023-01-14 RX ADMIN — CHLORHEXIDINE GLUCONATE 1 APPLICATION(S): 213 SOLUTION TOPICAL at 17:54

## 2023-01-14 RX ADMIN — Medication 81 MILLIGRAM(S): at 17:41

## 2023-01-14 RX ADMIN — PANTOPRAZOLE SODIUM 40 MILLIGRAM(S): 20 TABLET, DELAYED RELEASE ORAL at 13:49

## 2023-01-14 RX ADMIN — AMPICILLIN SODIUM AND SULBACTAM SODIUM 100 GRAM(S): 250; 125 INJECTION, POWDER, FOR SUSPENSION INTRAMUSCULAR; INTRAVENOUS at 02:22

## 2023-01-14 RX ADMIN — ATORVASTATIN CALCIUM 80 MILLIGRAM(S): 80 TABLET, FILM COATED ORAL at 21:38

## 2023-01-14 RX ADMIN — AMPICILLIN SODIUM AND SULBACTAM SODIUM 100 GRAM(S): 250; 125 INJECTION, POWDER, FOR SUSPENSION INTRAMUSCULAR; INTRAVENOUS at 23:25

## 2023-01-14 RX ADMIN — ENOXAPARIN SODIUM 40 MILLIGRAM(S): 100 INJECTION SUBCUTANEOUS at 21:38

## 2023-01-14 NOTE — OCCUPATIONAL THERAPY INITIAL EVALUATION ADULT - LEVEL OF INDEPENDENCE: DRESS UPPER BODY, OT EVAL
Pt states that he wants a call back from the nurse to discuss the status of the [employer] paperwork that was sent over in December. Pt can be reached at     CELL: [phone]   (Ok to leave a detailed message on pt's phone)     minimum assist (75% patients effort)

## 2023-01-14 NOTE — OCCUPATIONAL THERAPY INITIAL EVALUATION ADULT - TRANSFER SAFETY CONCERNS NOTED: TOILET, REHAB EVAL
decreased balance during turns/stand pivot Star Wedge Flap Text: The defect edges were debeveled with a #15 scalpel blade.  Given the location of the defect, shape of the defect and the proximity to free margins a star wedge flap was deemed most appropriate.  Using a sterile surgical marker, an appropriate rotation flap was drawn incorporating the defect and placing the expected incisions within the relaxed skin tension lines where possible. The area thus outlined was incised deep to adipose tissue with a #15 scalpel blade.  The skin margins were undermined to an appropriate distance in all directions utilizing iris scissors.

## 2023-01-14 NOTE — CHART NOTE - NSCHARTNOTEFT_GEN_A_CORE
Called by MRI exam cancelled due to patient has penile implant.  Pt unclear which hospital exactly, just reports [facility] on [address] [place] [address].  Attempted to reach office, family, and Dr. Hansen without success.   Would follow up with family and attempt to reach Dr. Hansen for further information regarding penile implant and MRI compatibility. Called by MRI, exam cancelled due to patient has penile implant.  Pt unclear which hospital exactly, just reports [facility] on [address] [place] [address].  Attempted to reach office, family, and Dr. Hansen without success.   Spoke with urology PA who will reach out to Dr. Glasgow for further information.  If unable to reach conclusion, would follow up with family and attempt to reach Dr. Hansen for MRI compatibility Called by MRI, exam cancelled due to patient has penile implant.  Pt unclear which hospital exactly, just reports [facility] on [address] [place] [address].  Attempted to reach office, family, and Dr. Hansen without success.   Spoke with urology PA who reached out to Dr. Glasgow.   Recommends getting more information regarding when it was placed, who placed it, and if patient has a letter regarding make and model of implant. Unable to decide if MRI compatible at this time.

## 2023-01-14 NOTE — SWALLOW BEDSIDE ASSESSMENT ADULT - SWALLOW EVAL: RECOMMENDED FEEDING/EATING TECHNIQUES
eat slowly/allow for swallow between intakes/alternate food with liquid/maintain upright posture during/after eating for 30 mins/oral hygiene/position upright (90 degrees)/small sips/bites

## 2023-01-14 NOTE — OCCUPATIONAL THERAPY INITIAL EVALUATION ADULT - LIVES WITH, PROFILE
with his wife in a private house 4 steps to enter bilateral handrails, 1 flight to the bedroom/bathroom +walk in shower/spouse

## 2023-01-14 NOTE — PROGRESS NOTE ADULT - SUBJECTIVE AND OBJECTIVE BOX
Name: BENTON AHMADI  Age: 62y  Gender: Male    Pt was seen and examined.   c/o:  doing much better this am    Allergies:  No Known Allergies      PHYSICAL EXAM:    Vitals:  T(C): 36.5 (01-14-23 @ 22:00), Max: 38.1 (01-14-23 @ 12:47)  HR: 63 (01-14-23 @ 22:40) (63 - 91)  BP: 117/64 (01-14-23 @ 22:40) (102/56 - 165/83)  RR: 17 (01-14-23 @ 22:40) (10 - 28)  SpO2: 93% (01-14-23 @ 22:40) (91% - 100%)  Wt(kg): --Vital Signs Last 24 Hrs  T(C): 36.5 (14 Jan 2023 22:00), Max: 38.1 (14 Jan 2023 12:47)  T(F): 97.7 (14 Jan 2023 22:00), Max: 100.6 (14 Jan 2023 12:47)  HR: 63 (14 Jan 2023 22:40) (63 - 91)  BP: 117/64 (14 Jan 2023 22:40) (102/56 - 165/83)  BP(mean): 86 (14 Jan 2023 22:40) (72 - 111)  RR: 17 (14 Jan 2023 22:40) (10 - 28)  SpO2: 93% (14 Jan 2023 22:40) (91% - 100%)    Parameters below as of 14 Jan 2023 20:30  Patient On (Oxygen Delivery Method): nasal cannula  O2 Flow (L/min): 2        NECK: Supple, No JVD  CHEST/LUNG: b/l fine rales  HEART: S1,S2, N1 Regular rate and rhythm; No murmurs, rubs, or gallops  ABDOMEN: Soft, Nontender, Nondistended; Bowel sounds present  EXTREMITIES:  2+ Peripheral Pulses, No clubbing, cyanosis, or edema  Neuro - no focal neuro def, FROM x 4       LABS:                        12.4   9.79  )-----------( 209      ( 14 Jan 2023 05:37 )             37.6     01-14    145  |  106  |  32<H>  ----------------------------<  142<H>  4.1   |  28  |  1.4    Ca    9.0      14 Jan 2023 05:37    TPro  6.2  /  Alb  3.8  /  TBili  0.5  /  DBili  x   /  AST  17  /  ALT  24  /  AlkPhos  50  01-14    LIVER FUNCTIONS - ( 14 Jan 2023 05:37 )  Alb: 3.8 g/dL / Pro: 6.2 g/dL / ALK PHOS: 50 U/L / ALT: 24 U/L / AST: 17 U/L / GGT: x                 MEDICATIONS  (STANDING):  ampicillin/sulbactam  IVPB      ampicillin/sulbactam  IVPB 1.5 Gram(s) IV Intermittent every 6 hours  aspirin  chewable 81 milliGRAM(s) Oral daily  atorvastatin 80 milliGRAM(s) Oral at bedtime  chlorhexidine 2% Cloths 1 Application(s) Topical daily  enoxaparin Injectable 40 milliGRAM(s) SubCutaneous every 24 hours  hydrochlorothiazide 25 milliGRAM(s) Oral daily  norepinephrine Infusion 0.05 MICROgram(s)/kG/Min (9.75 mL/Hr) IV Continuous <Continuous>  pantoprazole  Injectable 40 milliGRAM(s) IV Push daily        RADIOLOGY & ADDITIONAL TESTS:    Imaging Personally Reviewed:  [ ] YES  [ ] NO    A/P:  61 yo male kth DM, previous TIA, CVA,covid last month,  HTN DLD presented for weakness.   As per his daughter, he was not feeling well and he was weak. He asked them to bring him to the hospital.   Here he was altered, had left sided weakness, code stroke was called and the patient was given tpa.   During the ct imaging, he vomitted and possibly aspirated, so he was intubated in order to protect his airways.   As per the daughter he was complaining of dry cough and he was prescribed anti tussive medications, but no other complains       IMPRESSION:   1.  TIA - the presentation based on review of records is likely c/w TIA              agree that the pt should be treated for secondary stroke prevention    MRI was cancelled today as the pt has a penile implant which I knew nothing about despite him being my pt for about 10 years.    THEREFORE, will plan for d/c in am as the MRI is  not going to change much in management at this point   will obtain an MRI as outpt once the penile implant specifications have been obtained at some point in the future           CNS: intubated on precedex, sp tpa, needs neurological assessment q 15 minutes for 2 hour then q30 for 2 hours then q 1 hour for 16 hours after tpa , if any change then ct of the head, CT at 11 am ( 24 hpurs after tpa), SAT   HEENT: Oral care, HOB at 45,     PULMONARY: intubated, no evidence of acute pathology, SAT, sbt     CARDIOVASCULAR:Continue pressors, ECHO,, lipid panel, a1c     GI: keep npo     RENAL: I=0, monitor electrolytes and replete as needed,    INFECTIOUS DISEASE: no evidence of acute infection     HEMATOLOGICAL:  hold ap, ac due to tpa     ENDOCRINE:  Follow up FS.  Insulin protocol if needed.     MUSCULOSKELETAL: Pt/rehab

## 2023-01-14 NOTE — SWALLOW BEDSIDE ASSESSMENT ADULT - COMMENTS
OT present during the evaluation; Pts. spouse and daughter at bedside. Vocal quality weak/hoarse. Pts. daughter reported improve vocal quality from yesterday. White coating noted on lingual surface.

## 2023-01-14 NOTE — PROGRESS NOTE ADULT - SUBJECTIVE AND OBJECTIVE BOX
Neurology Follow up note    Name  BENTON AHMADI    HPI:  61 yo male kth DM, previous TIA, CVA,covid last month,  HTN DLD presented for weakness.   As per his daughter, he was not feeling well and he was weak. He asked them to bring him to the hospital.   Here he was altered, had left sided weakness, code stroke was called and the patient was given tpa.   During the ct imaging, he vomitted and possibly aspirated, so he was intubated in order to protect his airways.   As per the daughter he was complaining of dry cough and he was prescribed anti tussive medications, but no other complains   The patient will be admitted to the ICU   (12 Jan 2023 15:39)      Interval History -          Vital Signs Last 24 Hrs  T(C): 36.1 (14 Jan 2023 07:01), Max: 38.8 (13 Jan 2023 11:00)  T(F): 97 (14 Jan 2023 07:01), Max: 101.8 (13 Jan 2023 11:00)  HR: 86 (14 Jan 2023 07:01) (64 - 100)  BP: 132/61 (14 Jan 2023 07:01) (75/48 - 176/86)  BP(mean): 88 (14 Jan 2023 07:01) (57 - 123)  RR: 20 (14 Jan 2023 07:01) (10 - 23)  SpO2: 97% (14 Jan 2023 07:01) (89% - 99%)    Parameters below as of 14 Jan 2023 07:01  Patient On (Oxygen Delivery Method): mask, Venturi    O2 Concentration (%): 35  ICU Vital Signs Last 24 Hrs  T(C): 36.1 (14 Jan 2023 07:01), Max: 38.8 (13 Jan 2023 11:00)  T(F): 97 (14 Jan 2023 07:01), Max: 101.8 (13 Jan 2023 11:00)  HR: 86 (14 Jan 2023 07:01) (64 - 100)  BP: 132/61 (14 Jan 2023 07:01) (75/48 - 176/86)  BP(mean): 88 (14 Jan 2023 07:01) (57 - 123)  ABP: --  ABP(mean): --  RR: 20 (14 Jan 2023 07:01) (10 - 23)  SpO2: 97% (14 Jan 2023 07:01) (89% - 99%)    O2 Parameters below as of 14 Jan 2023 07:01  Patient On (Oxygen Delivery Method): mask, Venturi    O2 Concentration (%): 35            Neurological Exam:   Mental status: Awake, alert and oriented x3.  Recent and remote memory intact.  Naming, repetition and comprehension intact.  Attention/concentration intact.  No dysarthria, no aphasia.  Fund of knowledge appropriate.    Cranial nerves: Fundoscopic exam demonstrated no abnormalities, pupils equally round and reactive to light, visual fields full, no nystagmus, extraocular muscles intact, V1 through V3 intact bilaterally and symmetric, face symmetric, hearing intact to finger rub, palate elevation symmetric, tongue was midline, sternocleidomastoid/shoulder shrug strength bilaterally 5/5.    Motor:  Normal bulk and tone, strength 5/5 in bilateral upper and lower extremities.   strength 5/5.  Rapid alternating movements intact and symmetric.   Sensation: Intact to light touch, proprioception, and pinprick.  No neglect.   Coordination: No dysmetria on finger-to-nose and heel-to-shin.  No clumsiness.  Reflexes: 2+ in upper and lower extremities, downgoing toes bilaterally  Gait: Narrow and steady. No ataxia.  Romberg negative    Medications  ampicillin/sulbactam  IVPB      ampicillin/sulbactam  IVPB 1.5 Gram(s) IV Intermittent every 6 hours  atorvastatin 80 milliGRAM(s) Oral at bedtime  chlorhexidine 2% Cloths 1 Application(s) Topical daily  dexMEDEtomidine Infusion 0.2 MICROgram(s)/kG/Hr IV Continuous <Continuous>  fentaNYL   Infusion. 0.5 MICROgram(s)/kG/Hr IV Continuous <Continuous>  norepinephrine Infusion 0.05 MICROgram(s)/kG/Min IV Continuous <Continuous>  pantoprazole  Injectable 40 milliGRAM(s) IV Push daily  propofol Infusion 5 MICROgram(s)/kG/Min IV Continuous <Continuous>      Lab                        12.4   9.79  )-----------( 209      ( 14 Jan 2023 05:37 )             37.6     01-14    145  |  106  |  32<H>  ----------------------------<  142<H>  4.1   |  28  |  1.4    Ca    9.0      14 Jan 2023 05:37    TPro  6.2  /  Alb  3.8  /  TBili  0.5  /  DBili  x   /  AST  17  /  ALT  24  /  AlkPhos  50  01-14    CAPILLARY BLOOD GLUCOSE      POCT Blood Glucose.: 145 mg/dL (14 Jan 2023 06:48)  POCT Blood Glucose.: 134 mg/dL (13 Jan 2023 23:55)  POCT Blood Glucose.: 175 mg/dL (13 Jan 2023 19:13)  POCT Blood Glucose.: 168 mg/dL (13 Jan 2023 15:00)  POCT Blood Glucose.: 183 mg/dL (13 Jan 2023 09:42)    LIVER FUNCTIONS - ( 14 Jan 2023 05:37 )  Alb: 3.8 g/dL / Pro: 6.2 g/dL / ALK PHOS: 50 U/L / ALT: 24 U/L / AST: 17 U/L / GGT: x           PT/INR - ( 12 Jan 2023 10:30 )   PT: 12.30 sec;   INR: 1.08 ratio         PTT - ( 12 Jan 2023 10:30 )  PTT:29.0 sec    LDL Cholesterol Calculated: 78 mg/dL (01.14.23 @ 05:37)    Hemoglobin A1C, Whole Blood: 7.0: Method: Immunoassay          Radiology  1NCHCT:   < from: CT Brain Stroke Protocol (01.12.23 @ 10:16) >    ACC: [id number] EXAM:  CT BRAIN STROKE PROTOCOL                          *** ADDENDUM # 1 ***    Clinical indictation should include: Slurred speech and generalized   weakness.    Findings were discussed with Dr. Lynn at 10:56am with readback   confirmation.    --- End of Report ---    *** END OF ADDENDUM # 1 ***      PROCEDURE DATE:  01/12/2023          INTERPRETATION:  CLINICAL INDICATION: Code stroke.    Technique: CT of the head was performed without contrast.    Multiple contiguous axial images were acquired from the skullbase to the   vertex without the administration of intravenous contrast.  Coronal and   sagittal reformations were made.    COMPARISON:  prior head CT dated 1/31/2019.    FINDINGS:    The ventricles and sulci are unremarkable in appearance. Gray-white   differentiation appears well maintained.     There is no intraparenchymal hematoma, mass effect or midline shift. No   abnormal extra-axial fluid collections are present.    The calvarium is intact. The visualized intraorbital compartments,   paranasal sinuses and mastoid complexes appear free of acute disease.   There is polypoid mucosal thickening noted of the bilateral maxillary   sinuses.    IMPRESSION:  No CT evidence of large acute territorial infarct.    No evidence of acute intracranial pathology. No mass effect, midline   shift or intracranial hemorrhage.    Chronic lacunar infarcts involving the left corona radiata as well as the   right thalamus.    A callback request was placed at 10:22am.    --- End of Report ---    ***Please see the addendum at the top of this report. It may contain   additional important information or changes.****    < end of copied text >      < from: CT Head No Cont (01.12.23 @ 16:15) >    PROCEDURE DATE:  01/12/2023          INTERPRETATION:  Clinical History / Reason for exam: strabismus status   post tPA    Technique: Noncontrast head CT.  Contiguous unenhanced CT axial images of   the head from the base to the vertex with coronal and sagittal reformats.    Comparison: CT head 1/12/2023, 10:13 a.m.    Findings:    There is motion artifact.    The ventricles and cortical sulci are stable and within normal limits for   age.    There are stable chronic lacunar infarcts within the left corona radiata   and right thalamus.    There is no acute intracranial hemorrhage, extra-axial fluid collection   or midline shift.  Gray white matter differentiation is maintained.    Vascular calcifications are noted.    Polypoid mucosal thickening is seen within the maxillary sinuses. There   is additional mild scattered paranasal sinus mucosal thickening. The   mastoids are clear.    IMPRESSION:    1.  No evidence of acute intracranial hemorrhage or large territory   infarct. Stable exam since earlier the same day.    2.  Stable chronic lacunar infarcts in the left corona radiata and right   thalamus.    --- End of Report ---    < end of copied text >      < from: CT Brain Perfusion Maps Stroke (01.12.23 @ 11:50) >    ACC: [id number] EXAM:  CT ANGIO BRAIN STROKE PROT IC                        ACC: [id number] EXAM:  CT ANGIO NECK STROKE PROT IC                        ACC: [id number] EXAM:  CT BRAIN PERFUSION MAPS STROKE                          PROCEDURE DATE:  01/12/2023          INTERPRETATION:  CLINICAL INDICATION: Stroke code. Generalized weakness   with slurred speech. .    TECHNIQUE: CT angiography of the intracranial (head) and extracranial   (neck) circulation was performed after contrast administration    Maximal intensity projection images were additionally included and   reviewed.    RAPID PERFUSION images were obtained. Color maps were reviewed.    190 mls of Omnipaque 350 was administered intravenously without   complication and 10 mls were discarded.    Using a separate workstation, 3-D shaded surface reformations were made   of vasculature. 3-D reformations were reviewed and included in   interpretation of the official report.    CAROTID STENOSIS REFERENCE: (NASCET = 100x1-(N/D)). N=greatest narrowing.   D=normal distal diameter - MILD = <50% stenosis. - MODERATE = 50-69%   stenosis. - SEVERE = 70-89% stenosis. - HAIRLINE/CRITICAL = 90-99%   stenosis. - OCCLUDED = 100% stenosis.    COMPARISON: CTA of the head and neck dated 1/30/2019.    FINDINGS:    CT PERFUSION:    There are scattered regions of hypoperfusion using Tmax threshold of 4   seconds totaling 130 MLS 9 times distribution. There is no evidence of   perfusion mismatch.    HEAD CTA:    The internal carotid arteries demonstrate normal enhancement to the   intracranial circulation and [place] of [place]. There is atherosclerotic   calcification of the bilateral carotid siphons resulting in mild to   moderate stenosis bilaterally.    There is moderate focal stenosis noted of the distal left M1 segment of   the MCA. Distal MCA branch vessels are patent. The right MCA is patent   without stenosis.    Anterior cerebral arteries demonstrate normal enhancement and symmetric   arborization without intraluminal filling defect, stenosis, occlusion,   aneurysm or vascular malformation.    The intradural vertebral arteries demonstrate normal enhancement to the   vertebrobasilar confluence. Branch vasculature of the posterior   circulation are within normal limits. A segmental stenosis is noted of   the right P3 segment of the PCA, series 304 image 275. The distal PCA   appears patent. The left PCA is patent without stenosis.    Visualized dural venous sinuses and deep cerebral venous structures   demonstrate normal enhancement without evidence for filling defect.    NECK CTA:    The aortic arch and origins of the great vessels are within normal limits.    Right:  The origin of the right common carotid artery is normal. The   right common carotid artery is normal in course and caliber to the   carotid bifurcation. Origins of the internal and external carotid   arteries are normal by NASCET criteria. There is minimal atherosclerotic   calcification involving the proximal right ICA without stenosis. The   right internal carotid artery has normal course and caliber to the   intracranial circulation.    The origin of the right vertebral artery is normal. The right vertebral   artery is normal in course and caliber to the intracranial circulation.    Left: The origin of the left common carotid artery is normal. The left   common carotid artery is normal in course and caliber to the carotid   bifurcation. Origins of the internal and external carotid arteries are   normal by NASCET criteria. There is minimal atherosclerotic calcification   involving the proximal left ICA without stenosis. The left internal   carotid artery has normal course and caliber to the intracranial   circulation.    The origin of the left vertebral artery is normal. The left vertebral   artery is normal in course and caliber to the intracranial circulation.  Endotracheal and enteric tubes are noted, tips not visualized.    IMPRESSION:    CT PERFUSION:  No evidence of perfusion mismatch.    CTA HEAD:  Segmental stenosis is noted of the right P3 segment of the PCA without   evidence of vessel occlusion.    Moderate stenosis of the left M1 segment of the MCA.    Mild to moderate stenoses of the bilateral carotid siphons due to   atherosclerotic calcification.    CTA NECK:  No evidence of carotid or vertebral artery stenosis.    --- End of Report ---    < end of copied text >      < from: CT Head No Cont (01.13.23 @ 13:29) >  PROCEDURE DATE:  01/13/2023          INTERPRETATION:  CLINICAL INDICATION: Status post TPA. Stroke.    Technique: CT of the head was performed without contrast.    Multiple contiguous axial images were acquired from the skullbase to the   vertex without the administration of intravenous contrast.  Coronal and   sagittal reformations were made.    COMPARISON:  prior head CT dated 1/12/2023    FINDINGS:    The ventricles and sulci are unremarkable in appearance. Gray-white   differentiation appears well maintained.    Chronic lacunar infarcts involving the left basal ganglia as well as the   right thalamus. There is no intraparenchymal hematoma, mass effect or   midline shift. No abnormalextra-axial fluid collections are present.    The calvarium is intact. The visualized intraorbital compartments,   paranasal sinuses and mastoid complexes appear free of acute disease.   There is polypoid mucosal thickening noted of the bilateral maxillary   sinuses.    IMPRESSION:  No evidence of acute intracranial pathology. No mass effect, midline   shift or intracranial hemorrhage.    Chronic lacunar infarcts involving the left corona radiata as well as the   right thalamus.    --- End of Report ---    < end of copied text >        Other studies:     Assessment- This is a 62y year old Male presenting with     Plan  1.    Neurology Follow up note    Name  BENTON AHMADI    HPI:  63 yo male kth DM, previous TIA, CVA,covid last month,  HTN DLD presented for weakness.   As per his daughter, he was not feeling well and he was weak. He asked them to bring him to the hospital.   Here he was altered, had left sided weakness, code stroke was called and the patient was given tpa.   During the ct imaging, he vomitted and possibly aspirated, so he was intubated in order to protect his airways.   As per the daughter he was complaining of dry cough and he was prescribed anti tussive medications, but no other complains   The patient will be admitted to the ICU   (12 Jan 2023 15:39)      Interval History -Patient seen with daughter at bedside.  NO new complaints other then would like to drink something          Vital Signs Last 24 Hrs  T(C): 36.1 (14 Jan 2023 07:01), Max: 38.8 (13 Jan 2023 11:00)  T(F): 97 (14 Jan 2023 07:01), Max: 101.8 (13 Jan 2023 11:00)  HR: 86 (14 Jan 2023 07:01) (64 - 100)  BP: 132/61 (14 Jan 2023 07:01) (75/48 - 176/86)  BP(mean): 88 (14 Jan 2023 07:01) (57 - 123)  RR: 20 (14 Jan 2023 07:01) (10 - 23)  SpO2: 97% (14 Jan 2023 07:01) (89% - 99%)    Parameters below as of 14 Jan 2023 07:01  Patient On (Oxygen Delivery Method): mask, Venturi    O2 Concentration (%): 35  ICU Vital Signs Last 24 Hrs  T(C): 36.1 (14 Jan 2023 07:01), Max: 38.8 (13 Jan 2023 11:00)  T(F): 97 (14 Jan 2023 07:01), Max: 101.8 (13 Jan 2023 11:00)  HR: 86 (14 Jan 2023 07:01) (64 - 100)  BP: 132/61 (14 Jan 2023 07:01) (75/48 - 176/86)  BP(mean): 88 (14 Jan 2023 07:01) (57 - 123)  ABP: --  ABP(mean): --  RR: 20 (14 Jan 2023 07:01) (10 - 23)  SpO2: 97% (14 Jan 2023 07:01) (89% - 99%)    O2 Parameters below as of 14 Jan 2023 07:01  Patient On (Oxygen Delivery Method): mask, Venturi    O2 Concentration (%): 35            Neurological Exam:   Mental status: Awake, alert and oriented x3.  Recent and remote memory intact.  Naming, repetition and comprehension intact.  Attention/concentration intact.  No dysarthria, no aphasia.  Low volume speech  Cranial nerves: pupils equally round and reactive to light, visual fields full, no nystagmus, extraocular muscles intact, V1 through V3 intact bilaterally and symmetric, face symmetric, hearing intact to finger rub, palate elevation symmetric, tongue was midline, sternocleidomastoid/shoulder shrug strength bilaterally 5/5.    Motor:  Normal bulk and tone, strength 5/5 in bilateral upper and lower extremities.   strength 5/5.  Rapid alternating movements intact and symmetric.   Sensation: Intact to light touch and pinprick.  No neglect.   Coordination: No dysmetria on finger-to-nose and heel-to-shin.  No clumsiness.    Medications  ampicillin/sulbactam  IVPB      ampicillin/sulbactam  IVPB 1.5 Gram(s) IV Intermittent every 6 hours  atorvastatin 80 milliGRAM(s) Oral at bedtime  chlorhexidine 2% Cloths 1 Application(s) Topical daily  dexMEDEtomidine Infusion 0.2 MICROgram(s)/kG/Hr IV Continuous <Continuous>  fentaNYL   Infusion. 0.5 MICROgram(s)/kG/Hr IV Continuous <Continuous>  norepinephrine Infusion 0.05 MICROgram(s)/kG/Min IV Continuous <Continuous>  pantoprazole  Injectable 40 milliGRAM(s) IV Push daily  propofol Infusion 5 MICROgram(s)/kG/Min IV Continuous <Continuous>      Lab                        12.4   9.79  )-----------( 209      ( 14 Jan 2023 05:37 )             37.6     01-14    145  |  106  |  32<H>  ----------------------------<  142<H>  4.1   |  28  |  1.4    Ca    9.0      14 Jan 2023 05:37    TPro  6.2  /  Alb  3.8  /  TBili  0.5  /  DBili  x   /  AST  17  /  ALT  24  /  AlkPhos  50  01-14    CAPILLARY BLOOD GLUCOSE      POCT Blood Glucose.: 145 mg/dL (14 Jan 2023 06:48)  POCT Blood Glucose.: 134 mg/dL (13 Jan 2023 23:55)  POCT Blood Glucose.: 175 mg/dL (13 Jan 2023 19:13)  POCT Blood Glucose.: 168 mg/dL (13 Jan 2023 15:00)  POCT Blood Glucose.: 183 mg/dL (13 Jan 2023 09:42)    LIVER FUNCTIONS - ( 14 Jan 2023 05:37 )  Alb: 3.8 g/dL / Pro: 6.2 g/dL / ALK PHOS: 50 U/L / ALT: 24 U/L / AST: 17 U/L / GGT: x           PT/INR - ( 12 Jan 2023 10:30 )   PT: 12.30 sec;   INR: 1.08 ratio         PTT - ( 12 Jan 2023 10:30 )  PTT:29.0 sec    LDL Cholesterol Calculated: 78 mg/dL (01.14.23 @ 05:37)    Hemoglobin A1C, Whole Blood: 7.0: Method: Immunoassay          Radiology  1NCHCT:   < from: CT Brain Stroke Protocol (01.12.23 @ 10:16) >    ACC: [id number] EXAM:  CT BRAIN STROKE PROTOCOL                          *** ADDENDUM # 1 ***    Clinical indictation should include: Slurred speech and generalized   weakness.    Findings were discussed with Dr. Lynn at 10:56am with readback   confirmation.    --- End of Report ---    *** END OF ADDENDUM # 1 ***      PROCEDURE DATE:  01/12/2023          INTERPRETATION:  CLINICAL INDICATION: Code stroke.    Technique: CT of the head was performed without contrast.    Multiple contiguous axial images were acquired from the skullbase to the   vertex without the administration of intravenous contrast.  Coronal and   sagittal reformations were made.    COMPARISON:  prior head CT dated 1/31/2019.    FINDINGS:    The ventricles and sulci are unremarkable in appearance. Gray-white   differentiation appears well maintained.     There is no intraparenchymal hematoma, mass effect or midline shift. No   abnormal extra-axial fluid collections are present.    The calvarium is intact. The visualized intraorbital compartments,   paranasal sinuses and mastoid complexes appear free of acute disease.   There is polypoid mucosal thickening noted of the bilateral maxillary   sinuses.    IMPRESSION:  No CT evidence of large acute territorial infarct.    No evidence of acute intracranial pathology. No mass effect, midline   shift or intracranial hemorrhage.    Chronic lacunar infarcts involving the left corona radiata as well as the   right thalamus.    A callback request was placed at 10:22am.    --- End of Report ---    ***Please see the addendum at the top of this report. It may contain   additional important information or changes.****    < end of copied text >      < from: CT Head No Cont (01.12.23 @ 16:15) >    PROCEDURE DATE:  01/12/2023          INTERPRETATION:  Clinical History / Reason for exam: strabismus status   post tPA    Technique: Noncontrast head CT.  Contiguous unenhanced CT axial images of   the head from the base to the vertex with coronal and sagittal reformats.    Comparison: CT head 1/12/2023, 10:13 a.m.    Findings:    There is motion artifact.    The ventricles and cortical sulci are stable and within normal limits for   age.    There are stable chronic lacunar infarcts within the left corona radiata   and right thalamus.    There is no acute intracranial hemorrhage, extra-axial fluid collection   or midline shift.  Gray white matter differentiation is maintained.    Vascular calcifications are noted.    Polypoid mucosal thickening is seen within the maxillary sinuses. There   is additional mild scattered paranasal sinus mucosal thickening. The   mastoids are clear.    IMPRESSION:    1.  No evidence of acute intracranial hemorrhage or large territory   infarct. Stable exam since earlier the same day.    2.  Stable chronic lacunar infarcts in the left corona radiata and right   thalamus.    --- End of Report ---    < end of copied text >      < from: CT Brain Perfusion Maps Stroke (01.12.23 @ 11:50) >    ACC: [id number] EXAM:  CT ANGIO BRAIN STROKE [facility] IC                        ACC: [id number] EXAM:  CT ANGIO NECK STROKE PROT IC                        ACC: [id number] EXAM:  CT BRAIN PERFUSION MAPS STROKE                          PROCEDURE DATE:  01/12/2023          INTERPRETATION:  CLINICAL INDICATION: Stroke code. Generalized weakness   with slurred speech. .    TECHNIQUE: CT angiography of the intracranial (head) and extracranial   (neck) circulation was performed after contrast administration    Maximal intensity projection images were additionally included and   reviewed.    RAPID PERFUSION images were obtained. Color maps were reviewed.    190 mls of Omnipaque 350 was administered intravenously without   complication and 10 mls were discarded.    Using a separate workstation, 3-D shaded surface reformations were made   of vasculature. 3-D reformations were reviewed and included in   interpretation of the official report.    CAROTID STENOSIS REFERENCE: (NASCET = 100x1-(N/D)). N=greatest narrowing.   D=normal distal diameter - MILD = <50% stenosis. - MODERATE = 50-69%   stenosis. - SEVERE = 70-89% stenosis. - HAIRLINE/CRITICAL = 90-99%   stenosis. - OCCLUDED = 100% stenosis.    COMPARISON: CTA of the head and neck dated 1/30/2019.    FINDINGS:    CT PERFUSION:    There are scattered regions of hypoperfusion using Tmax threshold of 4   seconds totaling 130 MLS 9 times distribution. There is no evidence of   perfusion mismatch.    HEAD CTA:    The internal carotid arteries demonstrate normal enhancement to the   intracranial circulation and [place] of [place]. There is atherosclerotic   calcification of the bilateral carotid siphons resulting in mild to   moderate stenosis bilaterally.    There is moderate focal stenosis noted of the distal left M1 segment of   the MCA. Distal MCA branch vessels are patent. The right MCA is patent   without stenosis.    Anterior cerebral arteries demonstrate normal enhancement and symmetric   arborization without intraluminal filling defect, stenosis, occlusion,   aneurysm or vascular malformation.    The intradural vertebral arteries demonstrate normal enhancement to the   vertebrobasilar confluence. Branch vasculature of the posterior   circulation are within normal limits. A segmental stenosis is noted of   the right P3 segment of the PCA, series 304 image 275. The distal PCA   appears patent. The left PCA is patent without stenosis.    Visualized dural venous sinuses and deep cerebral venous structures   demonstrate normal enhancement without evidence for filling defect.    NECK CTA:    The aortic arch and origins of the great vessels are within normal limits.    Right:  The origin of the right common carotid artery is normal. The   right common carotid artery is normal in course and caliber to the   carotid bifurcation. Origins of the internal and external carotid   arteries are normal by NASCET criteria. There is minimal atherosclerotic   calcification involving the proximal right ICA without stenosis. The   right internal carotid artery has normal course and caliber to the   intracranial circulation.    The origin of the right vertebral artery is normal. The right vertebral   artery is normal in course and caliber to the intracranial circulation.    Left: The origin of the left common carotid artery is normal. The left   common carotid artery is normal in course and caliber to the carotid   bifurcation. Origins of the internal and external carotid arteries are   normal by NASCET criteria. There is minimal atherosclerotic calcification   involving the proximal left ICA without stenosis. The left internal   carotid artery has normal course and caliber to the intracranial   circulation.    The origin of the left vertebral artery is normal. The left vertebral   artery is normal in course and caliber to the intracranial circulation.  Endotracheal and enteric tubes are noted, tips not visualized.    IMPRESSION:    CT PERFUSION:  No evidence of perfusion mismatch.    CTA HEAD:  Segmental stenosis is noted of the right P3 segment of the PCA without   evidence of vessel occlusion.    Moderate stenosis of the left M1 segment of the MCA.    Mild to moderate stenoses of the bilateral carotid siphons due to   atherosclerotic calcification.    CTA NECK:  No evidence of carotid or vertebral artery stenosis.    --- End of Report ---    < end of copied text >      < from: CT Head No Cont (01.13.23 @ 13:29) >  PROCEDURE DATE:  01/13/2023          INTERPRETATION:  CLINICAL INDICATION: Status post TPA. Stroke.    Technique: CT of the head was performed without contrast.    Multiple contiguous axial images were acquired from the skullbase to the   vertex without the administration of intravenous contrast.  Coronal and   sagittal reformations were made.    COMPARISON:  prior head CT dated 1/12/2023    FINDINGS:    The ventricles and sulci are unremarkable in appearance. Gray-white   differentiation appears well maintained.    Chronic lacunar infarcts involving the left basal ganglia as well as the   right thalamus. There is no intraparenchymal hematoma, mass effect or   midline shift. No abnormalextra-axial fluid collections are present.    The calvarium is intact. The visualized intraorbital compartments,   paranasal sinuses and mastoid complexes appear free of acute disease.   There is polypoid mucosal thickening noted of the bilateral maxillary   sinuses.    IMPRESSION:  No evidence of acute intracranial pathology. No mass effect, midline   shift or intracranial hemorrhage.    Chronic lacunar infarcts involving the left corona radiata as well as the   right thalamus.    --- End of Report ---    < end of copied text >        Other studies:     Assessment- This is a 62y year old Male presenting with vomting and diffuse weakness s/p intubation and TNK administration.  Neurological exam is non-focal at this time.  Unclear whether he had a stroke however would obtain MRI brain w/o DEEDEE and conintue medications for secondary stroke prevention.    Plan  1. Continue current medications  2. MRI brain w/o DEEDEE  3. PT/OT

## 2023-01-14 NOTE — PROGRESS NOTE ADULT - SUBJECTIVE AND OBJECTIVE BOX
Patient is a 62y old  Male who presents with a chief complaint of weakness (13 Jan 2023 10:46)        Over Night Events:    extubated yesterday, tolerating venti-mask well today, unable to tolerate bipap overnight  Complains mostly of being thirsty today    ROS:     All ROS are negative except HPI           PHYSICAL EXAM    ICU Vital Signs Last 24 Hrs  T(C): 36.1 (14 Jan 2023 07:01), Max: 38.8 (13 Jan 2023 11:00)  T(F): 97 (14 Jan 2023 07:01), Max: 101.8 (13 Jan 2023 11:00)  HR: 81 (14 Jan 2023 06:25) (64 - 100)  BP: 125/57 (14 Jan 2023 06:25) (75/48 - 176/86)  BP(mean): 82 (14 Jan 2023 06:25) (57 - 123)  ABP: --  ABP(mean): --  RR: 20 (14 Jan 2023 07:01) (10 - 23)  SpO2: 92% (14 Jan 2023 06:25) (89% - 99%)    O2 Parameters below as of 14 Jan 2023 03:50  Patient On (Oxygen Delivery Method): mask, Venturi            Constitutional: no acute distress, well nourished well developed  Neuro: moving all 4 limbs spontaneously, no facial droop or dysarthria  HEENT: NCAT, anicteric  Neck: no visible lymphadenopathy or goiter  Pulm: no respiratory distress. clear to auscultation bilaterally  Cardiac: extremities appear pink and well-perfused.  regular rhythm and rate, no murmur detected  Abdomen: non-distended  Extremities: no peripheral edema      01-13-23 @ 07:01  -  01-14-23 @ 07:00  --------------------------------------------------------  IN:    Dexmedetomidine: 99 mL    IV PiggyBack: 150 mL    Norepinephrine: 179 mL  Total IN: 428 mL    OUT:    FentaNYL: 0 mL    Indwelling Catheter - Urethral (mL): 2405 mL    Propofol: 0 mL  Total OUT: 2405 mL    Total NET: -1977 mL      01-14-23 @ 07:01  -  01-14-23 @ 07:26  --------------------------------------------------------  IN:  Total IN: 0 mL    OUT:    Indwelling Catheter - Urethral (mL): 30 mL  Total OUT: 30 mL    Total NET: -30 mL          LABS:                            14.5   14.36 )-----------( 244      ( 13 Jan 2023 06:30 )             43.8                                               01-13    142  |  104  |  21<H>  ----------------------------<  210<H>  4.6   |  20  |  1.3    Ca    8.8      13 Jan 2023 06:30    TPro  6.7  /  Alb  4.2  /  TBili  0.6  /  DBili  x   /  AST  29  /  ALT  32  /  AlkPhos  60  01-13      PT/INR - ( 12 Jan 2023 10:30 )   PT: 12.30 sec;   INR: 1.08 ratio         PTT - ( 12 Jan 2023 10:30 )  PTT:29.0 sec                                       Urinalysis Basic - ( 13 Jan 2023 22:00 )    Color: Yellow / Appearance: Clear / SG: >=1.030 / pH: x  Gluc: x / Ketone: Trace  / Bili: Small / Urobili: 0.2 mg/dL   Blood: x / Protein: 100 mg/dL / Nitrite: Negative   Leuk Esterase: Negative / RBC: 6-10 /HPF / WBC 10-25 /HPF   Sq Epi: x / Non Sq Epi: Few /HPF / Bacteria: Moderate        CARDIAC MARKERS ( 12 Jan 2023 10:30 )  x     / <0.01 ng/mL / x     / x     / x                                                LIVER FUNCTIONS - ( 13 Jan 2023 06:30 )  Alb: 4.2 g/dL / Pro: 6.7 g/dL / ALK PHOS: 60 U/L / ALT: 32 U/L / AST: 29 U/L / GGT: x                                                                                               Mode: CPAP with PS  FiO2: 35  PEEP: 5  PS: 7                                      ABG - ( 13 Jan 2023 10:55 )  pH, Arterial: 7.36  pH, Blood: x     /  pCO2: 49    /  pO2: 122   / HCO3: 28    / Base Excess: 1.4   /  SaO2: 98.7                MEDICATIONS  (STANDING):  ampicillin/sulbactam  IVPB      ampicillin/sulbactam  IVPB 1.5 Gram(s) IV Intermittent every 6 hours  atorvastatin 80 milliGRAM(s) Oral at bedtime  chlorhexidine 2% Cloths 1 Application(s) Topical daily  dexMEDEtomidine Infusion 0.2 MICROgram(s)/kG/Hr (5.2 mL/Hr) IV Continuous <Continuous>  fentaNYL   Infusion. 0.5 MICROgram(s)/kG/Hr (5.2 mL/Hr) IV Continuous <Continuous>  norepinephrine Infusion 0.05 MICROgram(s)/kG/Min (9.75 mL/Hr) IV Continuous <Continuous>  pantoprazole  Injectable 40 milliGRAM(s) IV Push daily  propofol Infusion 5 MICROgram(s)/kG/Min (3.12 mL/Hr) IV Continuous <Continuous>    MEDICATIONS  (PRN):      New X-rays reviewed:       ECHO reviewed    CXR interpreted by me:      1/14  images and radiologist read reviewed, by my read demonstrating improving left lung opacity, otherwise stable.

## 2023-01-14 NOTE — OCCUPATIONAL THERAPY INITIAL EVALUATION ADULT - ADDITIONAL COMMENTS
PLOF obtained from patient, wife, and daughter. (+) driving. Pt states he can stay on the first floor in the beginning after d/c if needed. Daughter states he has had minor balance delays since previous stroke.

## 2023-01-14 NOTE — OCCUPATIONAL THERAPY INITIAL EVALUATION ADULT - PERTINENT HX OF CURRENT PROBLEM, REHAB EVAL
63 yo male kth DM, previous TIA, CVA, covid last month,  HTN DLD presented for weakness.   As per his daughter, he was not feeling well and he was weak. He asked them to bring him to the hospital. Here he was altered, had left sided weakness, code stroke was called and the patient was given tpa on 1/12/23. During the ct imaging, he vomited and possibly aspirated, so he was intubated 1/12 in order to protect his airways. Extubated 1/13. CT: 1/13 No evidence of acute intracranial pathology. No mass effect, midline shift or intracranial hemorrhage. Chronic lacunar infarcts involving the left corona radiata as well as the right thalamus.

## 2023-01-14 NOTE — OCCUPATIONAL THERAPY INITIAL EVALUATION ADULT - GENERAL OBSERVATIONS, REHAB EVAL
09:30-10:00 Chart reviewed, ok to treat by Occupational Therapist as confirmed by RN Yennifer, Pt received Semi-ramos's in bed +tele +ariza +O2 via venti mask FiO2 35% (switched to NC vis RN) +heplock right hand and left arm +sequentials in NAD. Pt in agreement with OT IE. MARY ALICE Braga present for OT IE

## 2023-01-15 ENCOUNTER — TRANSCRIPTION ENCOUNTER (OUTPATIENT)
Age: 63
End: 2023-01-15

## 2023-01-15 VITALS
DIASTOLIC BLOOD PRESSURE: 80 MMHG | HEART RATE: 73 BPM | OXYGEN SATURATION: 97 % | SYSTOLIC BLOOD PRESSURE: 140 MMHG | RESPIRATION RATE: 18 BRPM

## 2023-01-15 LAB
ALBUMIN SERPL ELPH-MCNC: 3.7 G/DL — SIGNIFICANT CHANGE UP (ref 3.5–5.2)
ALP SERPL-CCNC: 47 U/L — SIGNIFICANT CHANGE UP (ref 30–115)
ALT FLD-CCNC: 23 U/L — SIGNIFICANT CHANGE UP (ref 0–41)
ANION GAP SERPL CALC-SCNC: 14 MMOL/L — SIGNIFICANT CHANGE UP (ref 7–14)
AST SERPL-CCNC: 25 U/L — SIGNIFICANT CHANGE UP (ref 0–41)
BASOPHILS # BLD AUTO: 0.05 K/UL — SIGNIFICANT CHANGE UP (ref 0–0.2)
BASOPHILS NFR BLD AUTO: 0.5 % — SIGNIFICANT CHANGE UP (ref 0–1)
BILIRUB SERPL-MCNC: 0.8 MG/DL — SIGNIFICANT CHANGE UP (ref 0.2–1.2)
BUN SERPL-MCNC: 21 MG/DL — HIGH (ref 10–20)
CALCIUM SERPL-MCNC: 9 MG/DL — SIGNIFICANT CHANGE UP (ref 8.4–10.5)
CHLORIDE SERPL-SCNC: 100 MMOL/L — SIGNIFICANT CHANGE UP (ref 98–110)
CO2 SERPL-SCNC: 24 MMOL/L — SIGNIFICANT CHANGE UP (ref 17–32)
CREAT SERPL-MCNC: 1.1 MG/DL — SIGNIFICANT CHANGE UP (ref 0.7–1.5)
EGFR: 75 ML/MIN/1.73M2 — SIGNIFICANT CHANGE UP
EOSINOPHIL # BLD AUTO: 0.14 K/UL — SIGNIFICANT CHANGE UP (ref 0–0.7)
EOSINOPHIL NFR BLD AUTO: 1.4 % — SIGNIFICANT CHANGE UP (ref 0–8)
GLUCOSE BLDC GLUCOMTR-MCNC: 165 MG/DL — HIGH (ref 70–99)
GLUCOSE BLDC GLUCOMTR-MCNC: 241 MG/DL — HIGH (ref 70–99)
GLUCOSE SERPL-MCNC: 159 MG/DL — HIGH (ref 70–99)
HCT VFR BLD CALC: 40.2 % — LOW (ref 42–52)
HGB BLD-MCNC: 13.3 G/DL — LOW (ref 14–18)
IMM GRANULOCYTES NFR BLD AUTO: 0.4 % — HIGH (ref 0.1–0.3)
LYMPHOCYTES # BLD AUTO: 1.36 K/UL — SIGNIFICANT CHANGE UP (ref 1.2–3.4)
LYMPHOCYTES # BLD AUTO: 13.9 % — LOW (ref 20.5–51.1)
MAGNESIUM SERPL-MCNC: 1.7 MG/DL — LOW (ref 1.8–2.4)
MCHC RBC-ENTMCNC: 29.2 PG — SIGNIFICANT CHANGE UP (ref 27–31)
MCHC RBC-ENTMCNC: 33.1 G/DL — SIGNIFICANT CHANGE UP (ref 32–37)
MCV RBC AUTO: 88.2 FL — SIGNIFICANT CHANGE UP (ref 80–94)
MONOCYTES # BLD AUTO: 1.37 K/UL — HIGH (ref 0.1–0.6)
MONOCYTES NFR BLD AUTO: 14 % — HIGH (ref 1.7–9.3)
NEUTROPHILS # BLD AUTO: 6.81 K/UL — HIGH (ref 1.4–6.5)
NEUTROPHILS NFR BLD AUTO: 69.8 % — SIGNIFICANT CHANGE UP (ref 42.2–75.2)
NRBC # BLD: 0 /100 WBCS — SIGNIFICANT CHANGE UP (ref 0–0)
PLATELET # BLD AUTO: 169 K/UL — SIGNIFICANT CHANGE UP (ref 130–400)
POTASSIUM SERPL-MCNC: 4.3 MMOL/L — SIGNIFICANT CHANGE UP (ref 3.5–5)
POTASSIUM SERPL-SCNC: 4.3 MMOL/L — SIGNIFICANT CHANGE UP (ref 3.5–5)
PROT SERPL-MCNC: 6.1 G/DL — SIGNIFICANT CHANGE UP (ref 6–8)
RBC # BLD: 4.56 M/UL — LOW (ref 4.7–6.1)
RBC # FLD: 14.6 % — HIGH (ref 11.5–14.5)
SODIUM SERPL-SCNC: 138 MMOL/L — SIGNIFICANT CHANGE UP (ref 135–146)
WBC # BLD: 9.77 K/UL — SIGNIFICANT CHANGE UP (ref 4.8–10.8)
WBC # FLD AUTO: 9.77 K/UL — SIGNIFICANT CHANGE UP (ref 4.8–10.8)

## 2023-01-15 PROCEDURE — 99232 SBSQ HOSP IP/OBS MODERATE 35: CPT

## 2023-01-15 RX ORDER — MAGNESIUM SULFATE 500 MG/ML
2 VIAL (ML) INJECTION ONCE
Refills: 0 | Status: COMPLETED | OUTPATIENT
Start: 2023-01-15 | End: 2023-01-15

## 2023-01-15 RX ADMIN — AMPICILLIN SODIUM AND SULBACTAM SODIUM 100 GRAM(S): 250; 125 INJECTION, POWDER, FOR SUSPENSION INTRAMUSCULAR; INTRAVENOUS at 06:01

## 2023-01-15 RX ADMIN — Medication 81 MILLIGRAM(S): at 11:56

## 2023-01-15 RX ADMIN — Medication 25 GRAM(S): at 11:12

## 2023-01-15 RX ADMIN — AMPICILLIN SODIUM AND SULBACTAM SODIUM 100 GRAM(S): 250; 125 INJECTION, POWDER, FOR SUSPENSION INTRAMUSCULAR; INTRAVENOUS at 11:56

## 2023-01-15 RX ADMIN — PANTOPRAZOLE SODIUM 40 MILLIGRAM(S): 20 TABLET, DELAYED RELEASE ORAL at 11:56

## 2023-01-15 NOTE — SWALLOW BEDSIDE ASSESSMENT ADULT - SLP PERTINENT HISTORY OF CURRENT PROBLEM
63 yo male kth DM, previous TIA, CVA, covid last month,  HTN DLD. Pt. presented to the hospital with weakness, altered metal status, and L. Sided weakness. Stoke code. Given TPA. Pt. was intubated 1/12 and extubated 1/13.
63 yo male kth DM, previous TIA, CVA, covid last month,  HTN DLD. Pt. presented to the hospital with weakness, altered metal status, and L. Sided weakness. Stoke code. Given TPA. Pt. was intubated 1/12 and extubated 1/13.

## 2023-01-15 NOTE — SWALLOW BEDSIDE ASSESSMENT ADULT - NS SPL SWALLOW CLINIC TRIAL FT
Toleration of puree diet consistency with thin liquids w/o immediate overt s/s of penetration/aspiration. As per research decreased sensation s/p extubation therefore cannot r/o silent aspiration. Family and RN educated in depth on s/s of penetration/aspiration.
+ toleration observed without overt symptoms of penetration/aspiration

## 2023-01-15 NOTE — SWALLOW BEDSIDE ASSESSMENT ADULT - ADDITIONAL RECOMMENDATIONS
Tolerating least restrictive diet. Hoarse vocal quality continues to improve.   d/c from SLP services, reconsult PRN.

## 2023-01-15 NOTE — PHYSICAL THERAPY INITIAL EVALUATION ADULT - GENERAL OBSERVATIONS, REHAB EVAL
13;10-13;33 pt was seen for PT IE at bed side, pt is agreeable, chart thoroughly reviewed, RN Cherelle is aware. Pt received and left sitting in bed side chair. in no apparent distress. +IV, +call bell within reach, bed side table at reach, wife at  bed side.

## 2023-01-15 NOTE — SWALLOW BEDSIDE ASSESSMENT ADULT - SWALLOW EVAL: DIAGNOSIS
+Toleration of puree diet consistency with thin liquids w/o immediate overt s/s of penetration/aspiration. Cannot r/o silent aspiration at the bedside.
+ toleration observed without overt symptoms of penetration/aspiration for Regular/thins

## 2023-01-15 NOTE — PHYSICAL THERAPY INITIAL EVALUATION ADULT - ADDITIONAL COMMENTS
pt lives with his wife in a private house with 4 steps to enter and 1 flight of stairs to bedroom and bathroom area.  pt was independent community ambulator prior to admission without AD, pt was an active  prior to admission

## 2023-01-15 NOTE — DISCHARGE NOTE PROVIDER - NSDCCPCAREPLAN_GEN_ALL_CORE_FT
PRINCIPAL DISCHARGE DIAGNOSIS  Diagnosis: Stroke  Assessment and Plan of Treatment: You have been diagnosed with stroke which is a condition that develops when part of the brain doesn't get enough blood and oxygen   - Take your blood thinners and cholesterol medication as prescribed. Do not skip doses and do not run low on your medication. call your doctor if you need refills   - If you have diabetes, take your diabetes medication as prescribed. Check your blood sugar level every day and contact your doctor if the levels arr high as your medication may need to be re-adjusted or some medication may need to be added   - If you have hypertension, take your blood pressure medication as prescribed. Measure your blood pressure with a cuff every day and write down the values. Call your doctors if the values are high despite medication as he may adjust your medication   - Avoid smoking and do not let anyone smoke next to you. If you are a smoker and find it hard to smoke seek help or call your doctors for referrals for counselling programs   - Follow up with your doctor as outpatient. he may prescribe regular lab work to keep track of your cholesterol and sugar levels in your blood. Do not skip appointments and always be compliant wit your doctor's advise  - Call 911 or report to the emergency department if you have sudden onset severe headache, vision problems such as blurry or double vision or vision that is going dark, vertigo, numbness or weakness anywhere in your body, slurred speech or difficulty walking

## 2023-01-15 NOTE — DISCHARGE NOTE PROVIDER - HOSPITAL COURSE
63 yo male kth DM, previous TIA, CVA,covid last month,  HTN DLD presented for weakness.   As per his daughter, he was not feeling well and he was weak. He asked them to bring him to the hospital.   Here he was altered, had left sided weakness, code stroke was called and the patient was given tpa.   During the ct imaging, he vomitted and possibly aspirated, so he was intubated in order to protect his airways.   As per the daughter he was complaining of dry cough and he was prescribed anti tussive medications, but no other complains   The patient will be admitted to the ICU    Patient is now stable to be d/c home. can have MRI Outpatient due to penile implant

## 2023-01-15 NOTE — PROGRESS NOTE ADULT - SUBJECTIVE AND OBJECTIVE BOX
Patient is a 62y old  Male who presents with a chief complaint of weakness (14 Jan 2023 23:38)        Over Night Events:    no acute overnight events, no complaints this AM  off levophed  afebrile overnight    ROS:     All ROS are negative except HPI           PHYSICAL EXAM    ICU Vital Signs Last 24 Hrs  T(C): 36.4 (15 Alonso 2023 07:01), Max: 38.1 (14 Jan 2023 12:47)  T(F): 97.6 (15 Alonso 2023 07:01), Max: 100.6 (14 Jan 2023 12:47)  HR: 68 (15 Alonso 2023 07:01) (63 - 91)  BP: 138/75 (15 Alonso 2023 07:01) (117/64 - 165/83)  BP(mean): 101 (15 Alonso 2023 07:01) (85 - 115)  ABP: --  ABP(mean): --  RR: 16 (15 Alonso 2023 07:01) (12 - 28)  SpO2: 92% (15 Alonso 2023 07:01) (91% - 100%)    O2 Parameters below as of 15 Alonso 2023 07:01  Patient On (Oxygen Delivery Method): nasal cannula  O2 Flow (L/min): 2          Constitutional: no acute distress, well nourished well developed  Neuro: moving all 4 limbs spontaneously, no facial droop or dysarthria  HEENT: NCAT, anicteric  Neck: no visible lymphadenopathy or goiter  Pulm: no respiratory distress. clear to auscultation bilaterally  Cardiac: extremities appear pink and well-perfused.  regular rhythm and rate, no murmur detected  Abdomen: non-distended  Extremities: no peripheral edema      01-14-23 @ 07:01  -  01-15-23 @ 07:00  --------------------------------------------------------  IN:    IV PiggyBack: 200 mL    Oral Fluid: 1120 mL  Total IN: 1320 mL    OUT:    Indwelling Catheter - Urethral (mL): 2185 mL    Norepinephrine: 0 mL  Total OUT: 2185 mL    Total NET: -865 mL      01-15-23 @ 07:01  -  01-15-23 @ 08:12  --------------------------------------------------------  IN:  Total IN: 0 mL    OUT:    Indwelling Catheter - Urethral (mL): 130 mL  Total OUT: 130 mL    Total NET: -130 mL          LABS:                            12.4   9.79  )-----------( 209      ( 14 Jan 2023 05:37 )             37.6                                               01-14    145  |  106  |  32<H>  ----------------------------<  142<H>  4.1   |  28  |  1.4    Ca    9.0      14 Jan 2023 05:37    TPro  6.2  /  Alb  3.8  /  TBili  0.5  /  DBili  x   /  AST  17  /  ALT  24  /  AlkPhos  50  01-14                                             Urinalysis Basic - ( 13 Jan 2023 22:00 )    Color: Yellow / Appearance: Clear / SG: >=1.030 / pH: x  Gluc: x / Ketone: Trace  / Bili: Small / Urobili: 0.2 mg/dL   Blood: x / Protein: 100 mg/dL / Nitrite: Negative   Leuk Esterase: Negative / RBC: 6-10 /HPF / WBC 10-25 /HPF   Sq Epi: x / Non Sq Epi: Few /HPF / Bacteria: Moderate                                                  LIVER FUNCTIONS - ( 14 Jan 2023 05:37 )  Alb: 3.8 g/dL / Pro: 6.2 g/dL / ALK PHOS: 50 U/L / ALT: 24 U/L / AST: 17 U/L / GGT: x                                                  Culture - Blood (collected 13 Jan 2023 16:23)  Source: .Blood None  Preliminary Report (14 Jan 2023 23:02):    No growth to date.                                                                                       ABG - ( 13 Jan 2023 10:55 )  pH, Arterial: 7.36  pH, Blood: x     /  pCO2: 49    /  pO2: 122   / HCO3: 28    / Base Excess: 1.4   /  SaO2: 98.7                MEDICATIONS  (STANDING):  ampicillin/sulbactam  IVPB      ampicillin/sulbactam  IVPB 1.5 Gram(s) IV Intermittent every 6 hours  aspirin  chewable 81 milliGRAM(s) Oral daily  atorvastatin 80 milliGRAM(s) Oral at bedtime  chlorhexidine 2% Cloths 1 Application(s) Topical daily  enoxaparin Injectable 40 milliGRAM(s) SubCutaneous every 24 hours  hydrochlorothiazide 25 milliGRAM(s) Oral daily  norepinephrine Infusion 0.05 MICROgram(s)/kG/Min (9.75 mL/Hr) IV Continuous <Continuous>  pantoprazole  Injectable 40 milliGRAM(s) IV Push daily    MEDICATIONS  (PRN):

## 2023-01-15 NOTE — SWALLOW BEDSIDE ASSESSMENT ADULT - SLP GENERAL OBSERVATIONS
Pt. received in chair next to bed, on RA. Mildly hoarse vocal quality continues to improve. A&Ox4.
Pt. received in bed on venti face mask. LEOBARDO Villarreal transitioned pt. to O2 nasal cannula on 5L. Pt. tolerating O2 nasal cannula well. OT Hetal assisted pt. to sit up in the chair.

## 2023-01-15 NOTE — PHYSICAL THERAPY INITIAL EVALUATION ADULT - PERTINENT HX OF CURRENT PROBLEM, REHAB EVAL
61 yo male kth DM, previous TIA, CVA,covid last month,  HTN DLD presented for weakness.   As per his daughter, he was not feeling well and he was weak. He asked them to bring him to the hospital.   Here he was altered, had left sided weakness, code stroke was called and the patient was given tpa.   During the ct imaging, he vomitted and possibly aspirated, so he was intubated in order to protect his airways.   As per the daughter he was complaining of dry cough and he was prescribed anti tussive medications, but no other complains   The patient will be admitted to the ICU

## 2023-01-15 NOTE — SWALLOW BEDSIDE ASSESSMENT ADULT - SWALLOW EVAL: RECOMMENDED DIET
Puree diet consistency with thin liquids, only feed when pt. is on O2 nasal cannula. Do not feed with a face mask. If RR rate drops during meal time do not feed.
Regular/thins

## 2023-01-15 NOTE — DISCHARGE NOTE NURSING/CASE MANAGEMENT/SOCIAL WORK - PATIENT PORTAL LINK FT
You can access the FollowMyHealth Patient Portal offered by [facility] by registering at the following website: [email or web address]. By joining [facility]’s FollowMyHealth portal, you will also be able to view your health information using other applications (apps) compatible with our system.

## 2023-01-15 NOTE — DISCHARGE NOTE PROVIDER - NSDCMRMEDTOKEN_GEN_ALL_CORE_FT
aspirin 81 mg oral delayed release tablet: 1 tab(s) orally once a day  clopidogrel 75 mg oral tablet: 1 tab(s) orally once a day  finasteride 5 mg oral tablet: 1 tab(s) orally once a day  lisinopril-hydrochlorothiazide 20 mg-25 mg oral tablet: 1 tab(s) orally once a day  metFORMIN 500 mg oral tablet: 1 tab(s) orally 2 times a day  pioglitazone 30 mg oral tablet: 1 tab(s) orally once a day  rosuvastatin 40 mg oral tablet: 1 tab(s) orally once a day  tamsulosin 0.4 mg oral capsule: 1 cap(s) orally once a day

## 2023-01-15 NOTE — DISCHARGE NOTE PROVIDER - CARE PROVIDER_API CALL
Silviano Wells (MD)  Medicine  [address] [place] [address]  Phone: [phone]  Fax: [phone]  Follow Up Time: 2 weeks

## 2023-01-15 NOTE — PROGRESS NOTE ADULT - ASSESSMENT
IMPRESSION:\  acute resp failure 2nd   stroke post TPA         PLAN:    CNS:   follow neurology recommendation for post-stroke care  pain control PRN, avoid oversedation    HEENT: oral care     PULMONARY: Unable to tolerate BiPAP, doing well on ventimask  Wean to nasal cannula today    CARDIOVASCULAR:   taper pressors to keep SBP around 130-140 follow neurology recommendation   avoid hypotension   start asa if repeat ct scan no bleed as per stroke protocol follow neurology     GI: GI prophylaxis not indicated.  Diet per speech/swallow today.  Bowel regimen PRN    RENAL: monitor is and os   follow lytes     INFECTIOUS DISEASE: follow cx   check procal     HEMATOLOGICAL:  DVT prophylaxis. heparin SQ 24 hrs after tpa if ct brain negative     ENDOCRINE:  Follow up FS.  Insulin protocol if needed.    MUSCULOSKELETAL: PT/OT    Dispo per neuro  
61 yo male kth DM, previous TIA, CVA,covid last month,  HTN DLD presented for weakness.   As per his daughter, he was not feeling well and he was weak. He asked them to bring him to the hospital.   Here he was altered, had left sided weakness, code stroke was called and the patient was given tpa.   During the ct imaging, he vomitted and possibly aspirated, so he was intubated in order to protect his airways.   As per the daughter he was complaining of dry cough and he was prescribed anti tussive medications, but no other complains   The patient will be admitted to the ICU    IMPRESSION:  possible cva sp tpa    PLAN:    CNS: intubated on precedex, sp tpa, needs neurological assessment q 15 minutes for 2 hour then q30 for 2 hours then q 1 hour for 16 hours after tpa , if any change then ct of the head, CT at 11 am ( 24 hpurs after tpa), SAT   HEENT: Oral care, HOB at 45,     PULMONARY: intubated, no evidence of acute pathology, SAT, sbt     CARDIOVASCULAR:Continue pressors, ECHO,, lipid panel, a1c     GI: keep npo     RENAL: I=0, monitor electrolytes and replete as needed,    INFECTIOUS DISEASE: no evidence of acute infection     HEMATOLOGICAL:  hold ap, ac due to tpa     ENDOCRINE:  Follow up FS.  Insulin protocol if needed.     MUSCULOSKELETAL: Pt/rehab  
IMPRESSION:\  acute resp failure 2nd   stroke post TPA         PLAN:    CNS:   follow neurology recommendation for post-stroke care  pain control PRN, avoid oversedation    HEENT: oral care     PULMONARY: Wean FiO2 as tolerated    CARDIOVASCULAR:   avoid hypotension   start asa if repeat ct scan no bleed as per stroke protocol follow neurology     GI: GI prophylaxis not indicated.  Diet per speech/swallow today.  Bowel regimen PRN    RENAL: monitor is and os   follow lytes     INFECTIOUS DISEASE: follow cx     HEMATOLOGICAL:  DVT prophylaxis.    ENDOCRINE:  Follow up FS.  Insulin protocol if needed.    MUSCULOSKELETAL: PT/OT    Dispo per neuro, medically stable for GMF

## 2023-01-18 LAB
CULTURE RESULTS: SIGNIFICANT CHANGE UP
SPECIMEN SOURCE: SIGNIFICANT CHANGE UP

## 2023-01-19 LAB
CULTURE RESULTS: SIGNIFICANT CHANGE UP
SPECIMEN SOURCE: SIGNIFICANT CHANGE UP

## 2023-01-23 DIAGNOSIS — E83.42 HYPOMAGNESEMIA: ICD-10-CM

## 2023-01-23 DIAGNOSIS — I10 ESSENTIAL (PRIMARY) HYPERTENSION: ICD-10-CM

## 2023-01-23 DIAGNOSIS — I63.9 CEREBRAL INFARCTION, UNSPECIFIED: ICD-10-CM

## 2023-01-23 DIAGNOSIS — E11.9 TYPE 2 DIABETES MELLITUS WITHOUT COMPLICATIONS: ICD-10-CM

## 2023-01-23 DIAGNOSIS — E78.00 PURE HYPERCHOLESTEROLEMIA, UNSPECIFIED: ICD-10-CM

## 2023-01-23 DIAGNOSIS — J96.90 RESPIRATORY FAILURE, UNSPECIFIED, UNSPECIFIED WHETHER WITH HYPOXIA OR HYPERCAPNIA: ICD-10-CM

## 2023-01-23 DIAGNOSIS — G81.94 HEMIPLEGIA, UNSPECIFIED AFFECTING LEFT NONDOMINANT SIDE: ICD-10-CM

## 2023-01-23 DIAGNOSIS — R29.728 NIHSS SCORE 28: ICD-10-CM

## 2023-01-23 DIAGNOSIS — R47.81 SLURRED SPEECH: ICD-10-CM

## 2023-01-25 NOTE — H&P ADULT - PMH
NAHID Abernathy is a 60 y.o. female seen for annual GYN exam.  She has been diagnosed with a bone spur in her heel and is having pain with walking. She will be referred to female orthopedics for evaluation. She also was given information for [facility] primary care so she can establish future care. Also she is given a sheet for [facility] to plan her first colonoscopy. Past Medical History, Past Surgical History, Family history, Social History, and Medications were all reviewed with the patient today and updated as necessary. Current Outpatient Medications   Medication Sig    estradiol (ESTRACE) 0.1 MG/GM vaginal cream Use 0.5 gram in the vagina 3x weekly at bedtime    loratadine (CLARITIN) 10 MG tablet Take 10 mg by mouth     No current facility-administered medications for this visit.      Allergies   Allergen Reactions    Hydrocodone-Acetaminophen Nausea And Vomiting     Past Medical History:   Diagnosis Date    Arthritis     Breast cancer ([facility] [address]     Fibroid, uterine     Submucosal    Fibroids, submucosal     Morbid obesity (HCC)     Nausea & vomiting     after anesthesia \"long time ago\"    Tumor of breast     left phyllodes tumor     Past Surgical History:   Procedure Laterality Date    BREAST RECONSTRUCTION Left 10/25/2017    LEFT BREAST RECONSTRUCTION REVISION/ TISSUE EXPANDER performed by Henna Hassan MD at [address] [place] [address] Dr Ramon 6/20/2018    LEFT BREAST REVISION RECONSTRUCTION   performed by Henna Hassan MD at [address] [place] [address] Dr Ramon 6/20/2018    LEFT BREAST TISSUE EXPANDER EXCHANGE FOR PERMANENT IMPLANT performed by Henna Hassan MD at [address] [place] [address] Dr Ramon 3/7/2018    LEFT  BREAST TISSUE EXPANDER INSERTION WITH REVISION AND EXCISION OF LEFT AXILLARY TISSUE performed by Henna Hassan MD at [address] [place] [address] Dr Ramon 9/6/2017    IMMEDIATE LEFT BREAST RECONSTRUCTION WITH EXPANDER/  PLACEMENT OF ALLODERM performed by Paulino Cheema MD at [facility] MAIN OR    BREAST REDUCTION SURGERY Right 2017    BREAST MASTOPEXY RIGHT FOR SYMMATRY performed by Paulino Cheema MD at [facility] [address] Left     LAP,TUBAL CAUTERY      MASTECTOMY Left 2017    LEFT BREAST MASTECTOMY SIMPLE performed by Padmini Giron MD at . [address] Left     partial shoulder replacement    US BREAST BIOPSY W LOC DEVICE 1ST LESION LEFT Left 2017    US BREAST NEEDLE BIOPSY LEFT 2017 [facility]     Family History   Problem Relation Age of Onset    Colon Cancer Neg Hx     Breast Cancer Neg Hx     Heart Disease Father     Hypertension Father 54    Lung Cancer Mother     Ovarian Cancer Mother 28      Social History     Tobacco Use    Smoking status: Never    Smokeless tobacco: Never   Substance Use Topics    Alcohol use: No       Social History     Substance and Sexual Activity   Sexual Activity Yes    Partners: Male    Comment:      OB History    Para Term  AB Living   3 2 0 0 1 0   SAB IAB Ectopic Molar Multiple Live Births   1 0 0 0 0 0      # Outcome Date GA Lbr Jose/2nd Weight Sex Delivery Anes PTL Lv   3 SAB            2 Para            1 Para                Health Maintenance  Mammogram: 22; Mastectomy Left   Colonoscopy: information given  Bone Density:None  Pap smear:21      Review of Systems  General: Not Present- Chills, Fever, Fatigue, Insomnia, Hot flashes/Night sweats, Weight gain  Skin: Not Present- Bruising, Change in Wart/Mole, Excessive Sweating, Itching, Nail Changes, New Lesions, Rash, Skin Color Changes and Ulcer. HEENT: Not Present- Headache, Blurred Vision, Double Vision, Glaucoma, Visual Disturbances, Hearing Loss, Ringing in the Ears, Vertigo, Nose Bleed, Bleeding Gums, Hoarseness and Sore Throat. Neck: Not Present- Neck Pain and Neck Swelling.   Respiratory: Not Present- Cough, Difficulty Breathing and Difficulty Breathing on Exertion. Breast: Not Present- Breast Mass, Breast Pain, Breast Swelling, Nipple Discharge, Nipple Pain, Recent Breast Size Changes and Skin Changes. Cardiovascular: Not Present- Abnormal Blood Pressure, Chest Pain, Edema, Fainting / Blacking Out, Palpitations, Shortness of Breath and Swelling of Extremities. Gastrointestinal: Not Present- Abdominal Pain, Abdominal Swelling, Bloating, Change in Bowel Habits, Constipation, Diarrhea, Difficulty Swallowing, Gets full quickly at meals, Nausea, Rectal Bleeding and Vomiting. Female Genitourinary: Not Present- Dysmenorrhea, Dyspareunia, Decreased libido, Excessive Menstrual Bleeding, Menstrual Irregularities, Pelvic Pain, Urinary Complaints, Vaginal Discharge, Vaginal itching/burning, Vaginal odor  Musculoskeletal: Not Present- Joint Pain and Muscle Pain. Neurological: Not Present- Dizziness, Fainting, Headaches and Seizures. Psychiatric: Not Present- Anxiety, Depression, Mood changes and Panic Attacks. Endocrine: Not Present- Appetite Changes, Cold Intolerance, Excessive Thirst, Excessive Urination and Heat Intolerance. Hematology: Not Present- Abnormal Bleeding, Easy Bruising and Enlarged Lymph Nodes. PHYSICAL EXAM:     /82 (Position: Sitting)   Ht 5' 2.6\" (1.59 m)   Wt 198 lb (89.8 kg)   BMI 35.52 kg/m²     Physical Exam   General   Mental Status - Alert. General Appearance - Cooperative. Integumentary   General Characteristics: Overall examination of the patient's skin reveals - no rashes and no suspicious lesions. Head and Neck  Head - normocephalic, atraumatic with no lesions or palpable masses. Neck Note: Normal   Thyroid   Gland Characteristics - normal size and consistency and no palpable nodules. Chest and Lung Exam   Chest and lung exam reveals - on auscultation, normal breath sounds, no adventitious sounds and normal vocal resonance.      Breast   Breast - Left - Normal. Right - Normal.     Cardiovascular CVA (cerebral vascular accident)    Diabetes    High cholesterol    HTN (hypertension) Cardiovascular examination reveals - normal heart sounds, regular rate and rhythm with no murmurs. Abdomen   Inspection: - Inspection Normal.   Palpation/Percussion: Palpation and Percussion of the abdomen reveal - Non Tender, No Rebound tenderness, No Rigidity (guarding), No hepatosplenomegaly, No Palpable abdominal masses and Soft. Auscultation: Auscultation of the abdomen reveals - Bowel sounds normal.     Female Genitourinary     External Genitalia   Vulva: - Normal. Perineum - Normal. Bartholin's Gland - Bilateral - Normal. Clitoris - Normal.   Introitus: Characteristics - Normal.   Urethra: Characteristics - Normal.     Speculum & Bimanual   Vagina: Vaginal Mucosa - Normal.   Vaginal Wall: - Normal.   Vaginal Lesions - None. Cervix: Characteristics - Normal.   Uterus: Characteristics - Normal.   Adnexa: - Normal.   Bladder - Normal.     Peripheral Vascular   Normal    Neuropsychiatric   Examination of related systems reveals - The patient is well-nourished and well-groomed. Mental status exam performed with findings of - Oriented X3 with appropriate mood and affect. Musculoskeletal  Normal      General Lymphatics  Normal           Medical problems and test results were reviewed with the patient today. ASSESSMENT and PLAN    1. Encounter for well woman exam with routine gynecological exam  2. Cervical cancer screening  3. Screening mammogram, encounter for  -     [facility] DANIELLE DIGITAL SCREEN UNILATERAL RIGHT; Future  4. Vaginal atrophy  -     estradiol (ESTRACE) 0.1 MG/GM vaginal cream; Use 0.5 gram in the vagina 3x weekly at bedtime, Disp-42.5 g, R-5Normal  5. Bone spur  -     [facility] - Garett Egan MD, Orthopedic Surgery, . [facility]           No follow-ups on file.        Caren Vaughn MD  1/26/2023

## 2023-10-05 ENCOUNTER — INPATIENT (INPATIENT)
Facility: HOSPITAL | Age: 63
LOS: 0 days | Discharge: HOME | DRG: 639 | End: 2023-10-06
Payer: MEDICARE

## 2023-10-05 VITALS
HEIGHT: 71 IN | RESPIRATION RATE: 17 BRPM | OXYGEN SATURATION: 98 % | DIASTOLIC BLOOD PRESSURE: 81 MMHG | WEIGHT: 201.94 LBS | HEART RATE: 100 BPM | TEMPERATURE: 97 F | SYSTOLIC BLOOD PRESSURE: 157 MMHG

## 2023-10-05 DIAGNOSIS — R55 SYNCOPE AND COLLAPSE: ICD-10-CM

## 2023-10-05 DIAGNOSIS — Z96.0 PRESENCE OF UROGENITAL IMPLANTS: Chronic | ICD-10-CM

## 2023-10-05 LAB
ALBUMIN SERPL ELPH-MCNC: 4.7 G/DL — SIGNIFICANT CHANGE UP (ref 3.5–5.2)
ALP SERPL-CCNC: 53 U/L — SIGNIFICANT CHANGE UP (ref 30–115)
ALT FLD-CCNC: 30 U/L — SIGNIFICANT CHANGE UP (ref 0–41)
ANION GAP SERPL CALC-SCNC: 14 MMOL/L — SIGNIFICANT CHANGE UP (ref 7–14)
APPEARANCE UR: CLEAR — SIGNIFICANT CHANGE UP
AST SERPL-CCNC: 25 U/L — SIGNIFICANT CHANGE UP (ref 0–41)
BASOPHILS # BLD AUTO: 0.03 K/UL — SIGNIFICANT CHANGE UP (ref 0–0.2)
BASOPHILS NFR BLD AUTO: 0.5 % — SIGNIFICANT CHANGE UP (ref 0–1)
BILIRUB SERPL-MCNC: 0.4 MG/DL — SIGNIFICANT CHANGE UP (ref 0.2–1.2)
BILIRUB UR-MCNC: NEGATIVE — SIGNIFICANT CHANGE UP
BUN SERPL-MCNC: 29 MG/DL — HIGH (ref 10–20)
CALCIUM SERPL-MCNC: 9.9 MG/DL — SIGNIFICANT CHANGE UP (ref 8.4–10.5)
CHLORIDE SERPL-SCNC: 101 MMOL/L — SIGNIFICANT CHANGE UP (ref 98–110)
CO2 SERPL-SCNC: 26 MMOL/L — SIGNIFICANT CHANGE UP (ref 17–32)
COLOR SPEC: YELLOW — SIGNIFICANT CHANGE UP
CREAT SERPL-MCNC: 1.3 MG/DL — SIGNIFICANT CHANGE UP (ref 0.7–1.5)
DIFF PNL FLD: NEGATIVE — SIGNIFICANT CHANGE UP
EGFR: 61 ML/MIN/1.73M2 — SIGNIFICANT CHANGE UP
EOSINOPHIL # BLD AUTO: 0.11 K/UL — SIGNIFICANT CHANGE UP (ref 0–0.7)
EOSINOPHIL NFR BLD AUTO: 1.7 % — SIGNIFICANT CHANGE UP (ref 0–8)
GAS PNL BLDV: SIGNIFICANT CHANGE UP
GLUCOSE BLDC GLUCOMTR-MCNC: 134 MG/DL — HIGH (ref 70–99)
GLUCOSE BLDC GLUCOMTR-MCNC: 155 MG/DL — HIGH (ref 70–99)
GLUCOSE SERPL-MCNC: 164 MG/DL — HIGH (ref 70–99)
GLUCOSE UR QL: 500 MG/DL
HCT VFR BLD CALC: 41.4 % — LOW (ref 42–52)
HGB BLD-MCNC: 14 G/DL — SIGNIFICANT CHANGE UP (ref 14–18)
IMM GRANULOCYTES NFR BLD AUTO: 0.2 % — SIGNIFICANT CHANGE UP (ref 0.1–0.3)
KETONES UR-MCNC: ABNORMAL MG/DL
LACTATE SERPL-SCNC: 1.1 MMOL/L — SIGNIFICANT CHANGE UP (ref 0.7–2)
LEUKOCYTE ESTERASE UR-ACNC: NEGATIVE — SIGNIFICANT CHANGE UP
LIDOCAIN IGE QN: 22 U/L — SIGNIFICANT CHANGE UP (ref 7–60)
LYMPHOCYTES # BLD AUTO: 1.37 K/UL — SIGNIFICANT CHANGE UP (ref 1.2–3.4)
LYMPHOCYTES # BLD AUTO: 20.8 % — SIGNIFICANT CHANGE UP (ref 20.5–51.1)
MCHC RBC-ENTMCNC: 29.5 PG — SIGNIFICANT CHANGE UP (ref 27–31)
MCHC RBC-ENTMCNC: 33.8 G/DL — SIGNIFICANT CHANGE UP (ref 32–37)
MCV RBC AUTO: 87.2 FL — SIGNIFICANT CHANGE UP (ref 80–94)
MONOCYTES # BLD AUTO: 0.5 K/UL — SIGNIFICANT CHANGE UP (ref 0.1–0.6)
MONOCYTES NFR BLD AUTO: 7.6 % — SIGNIFICANT CHANGE UP (ref 1.7–9.3)
NEUTROPHILS # BLD AUTO: 4.56 K/UL — SIGNIFICANT CHANGE UP (ref 1.4–6.5)
NEUTROPHILS NFR BLD AUTO: 69.2 % — SIGNIFICANT CHANGE UP (ref 42.2–75.2)
NITRITE UR-MCNC: NEGATIVE — SIGNIFICANT CHANGE UP
NRBC # BLD: 0 /100 WBCS — SIGNIFICANT CHANGE UP (ref 0–0)
PH UR: 7 — SIGNIFICANT CHANGE UP (ref 5–8)
PLATELET # BLD AUTO: 156 K/UL — SIGNIFICANT CHANGE UP (ref 130–400)
PMV BLD: 11.9 FL — HIGH (ref 7.4–10.4)
POTASSIUM SERPL-MCNC: 3.9 MMOL/L — SIGNIFICANT CHANGE UP (ref 3.5–5)
POTASSIUM SERPL-SCNC: 3.9 MMOL/L — SIGNIFICANT CHANGE UP (ref 3.5–5)
PROT SERPL-MCNC: 6.9 G/DL — SIGNIFICANT CHANGE UP (ref 6–8)
PROT UR-MCNC: 30 MG/DL
RBC # BLD: 4.75 M/UL — SIGNIFICANT CHANGE UP (ref 4.7–6.1)
RBC # FLD: 14.4 % — SIGNIFICANT CHANGE UP (ref 11.5–14.5)
SODIUM SERPL-SCNC: 141 MMOL/L — SIGNIFICANT CHANGE UP (ref 135–146)
SP GR SPEC: 1.02 — SIGNIFICANT CHANGE UP (ref 1–1.03)
TROPONIN T SERPL-MCNC: <0.01 NG/ML — SIGNIFICANT CHANGE UP
UROBILINOGEN FLD QL: 0.2 MG/DL — SIGNIFICANT CHANGE UP (ref 0.2–1)
WBC # BLD: 6.58 K/UL — SIGNIFICANT CHANGE UP (ref 4.8–10.8)
WBC # FLD AUTO: 6.58 K/UL — SIGNIFICANT CHANGE UP (ref 4.8–10.8)

## 2023-10-05 PROCEDURE — 99285 EMERGENCY DEPT VISIT HI MDM: CPT | Mod: FS

## 2023-10-05 PROCEDURE — 80053 COMPREHEN METABOLIC PANEL: CPT

## 2023-10-05 PROCEDURE — 82962 GLUCOSE BLOOD TEST: CPT

## 2023-10-05 PROCEDURE — 70450 CT HEAD/BRAIN W/O DYE: CPT | Mod: 26,MA

## 2023-10-05 PROCEDURE — 74177 CT ABD & PELVIS W/CONTRAST: CPT | Mod: 26,MA

## 2023-10-05 PROCEDURE — 83605 ASSAY OF LACTIC ACID: CPT

## 2023-10-05 PROCEDURE — 36415 COLL VENOUS BLD VENIPUNCTURE: CPT

## 2023-10-05 PROCEDURE — 93010 ELECTROCARDIOGRAM REPORT: CPT

## 2023-10-05 PROCEDURE — 83036 HEMOGLOBIN GLYCOSYLATED A1C: CPT

## 2023-10-05 PROCEDURE — 71045 X-RAY EXAM CHEST 1 VIEW: CPT | Mod: 26

## 2023-10-05 PROCEDURE — 84484 ASSAY OF TROPONIN QUANT: CPT

## 2023-10-05 RX ORDER — CLOPIDOGREL BISULFATE 75 MG/1
75 TABLET, FILM COATED ORAL DAILY
Refills: 0 | Status: DISCONTINUED | OUTPATIENT
Start: 2023-10-05 | End: 2023-10-06

## 2023-10-05 RX ORDER — DEXTROSE 50 % IN WATER 50 %
25 SYRINGE (ML) INTRAVENOUS ONCE
Refills: 0 | Status: DISCONTINUED | OUTPATIENT
Start: 2023-10-05 | End: 2023-10-06

## 2023-10-05 RX ORDER — INSULIN LISPRO 100/ML
VIAL (ML) SUBCUTANEOUS
Refills: 0 | Status: DISCONTINUED | OUTPATIENT
Start: 2023-10-05 | End: 2023-10-06

## 2023-10-05 RX ORDER — LANOLIN ALCOHOL/MO/W.PET/CERES
3 CREAM (GRAM) TOPICAL AT BEDTIME
Refills: 0 | Status: DISCONTINUED | OUTPATIENT
Start: 2023-10-05 | End: 2023-10-06

## 2023-10-05 RX ORDER — AMLODIPINE BESYLATE 2.5 MG/1
5 TABLET ORAL DAILY
Refills: 0 | Status: DISCONTINUED | OUTPATIENT
Start: 2023-10-05 | End: 2023-10-06

## 2023-10-05 RX ORDER — FINASTERIDE 5 MG/1
5 TABLET, FILM COATED ORAL DAILY
Refills: 0 | Status: DISCONTINUED | OUTPATIENT
Start: 2023-10-05 | End: 2023-10-06

## 2023-10-05 RX ORDER — SODIUM CHLORIDE 9 MG/ML
1000 INJECTION INTRAMUSCULAR; INTRAVENOUS; SUBCUTANEOUS
Refills: 0 | Status: DISCONTINUED | OUTPATIENT
Start: 2023-10-05 | End: 2023-10-06

## 2023-10-05 RX ORDER — ATORVASTATIN CALCIUM 80 MG/1
40 TABLET, FILM COATED ORAL AT BEDTIME
Refills: 0 | Status: DISCONTINUED | OUTPATIENT
Start: 2023-10-05 | End: 2023-10-06

## 2023-10-05 RX ORDER — ONDANSETRON 8 MG/1
4 TABLET, FILM COATED ORAL ONCE
Refills: 0 | Status: COMPLETED | OUTPATIENT
Start: 2023-10-05 | End: 2023-10-05

## 2023-10-05 RX ORDER — SODIUM CHLORIDE 9 MG/ML
1000 INJECTION, SOLUTION INTRAVENOUS
Refills: 0 | Status: DISCONTINUED | OUTPATIENT
Start: 2023-10-05 | End: 2023-10-06

## 2023-10-05 RX ORDER — SODIUM CHLORIDE 9 MG/ML
1000 INJECTION INTRAMUSCULAR; INTRAVENOUS; SUBCUTANEOUS ONCE
Refills: 0 | Status: COMPLETED | OUTPATIENT
Start: 2023-10-05 | End: 2023-10-05

## 2023-10-05 RX ORDER — ONDANSETRON 8 MG/1
4 TABLET, FILM COATED ORAL EVERY 8 HOURS
Refills: 0 | Status: DISCONTINUED | OUTPATIENT
Start: 2023-10-05 | End: 2023-10-06

## 2023-10-05 RX ORDER — DEXTROSE 50 % IN WATER 50 %
15 SYRINGE (ML) INTRAVENOUS ONCE
Refills: 0 | Status: DISCONTINUED | OUTPATIENT
Start: 2023-10-05 | End: 2023-10-06

## 2023-10-05 RX ORDER — ASPIRIN/CALCIUM CARB/MAGNESIUM 324 MG
81 TABLET ORAL DAILY
Refills: 0 | Status: DISCONTINUED | OUTPATIENT
Start: 2023-10-05 | End: 2023-10-06

## 2023-10-05 RX ORDER — ACETAMINOPHEN 500 MG
650 TABLET ORAL EVERY 6 HOURS
Refills: 0 | Status: DISCONTINUED | OUTPATIENT
Start: 2023-10-05 | End: 2023-10-06

## 2023-10-05 RX ORDER — GLUCAGON INJECTION, SOLUTION 0.5 MG/.1ML
1 INJECTION, SOLUTION SUBCUTANEOUS ONCE
Refills: 0 | Status: DISCONTINUED | OUTPATIENT
Start: 2023-10-05 | End: 2023-10-06

## 2023-10-05 RX ORDER — DEXTROSE 50 % IN WATER 50 %
12.5 SYRINGE (ML) INTRAVENOUS ONCE
Refills: 0 | Status: DISCONTINUED | OUTPATIENT
Start: 2023-10-05 | End: 2023-10-06

## 2023-10-05 RX ORDER — MORPHINE SULFATE 50 MG/1
4 CAPSULE, EXTENDED RELEASE ORAL ONCE
Refills: 0 | Status: DISCONTINUED | OUTPATIENT
Start: 2023-10-05 | End: 2023-10-05

## 2023-10-05 RX ADMIN — ONDANSETRON 4 MILLIGRAM(S): 8 TABLET, FILM COATED ORAL at 20:23

## 2023-10-05 RX ADMIN — MORPHINE SULFATE 4 MILLIGRAM(S): 50 CAPSULE, EXTENDED RELEASE ORAL at 20:24

## 2023-10-05 RX ADMIN — Medication 0.1 MILLIGRAM(S): at 23:31

## 2023-10-05 RX ADMIN — SODIUM CHLORIDE 1000 MILLILITER(S): 9 INJECTION INTRAMUSCULAR; INTRAVENOUS; SUBCUTANEOUS at 20:23

## 2023-10-05 NOTE — ED PROVIDER NOTE - OBJECTIVE STATEMENT
64 yo M pmhx hld, dm, htn, cva BIBEMS for possible seizure vs hypoglycemic episode tonight. pt family states he was laying down on chair and had full body shaking, foaming from mouth and was not responding for a few seconds, family put spoons of honey in his mouth and he responded. pt family called EMS pt was given D50 en route and FS 95. pt was started on trulicity 1 week ago. upon arrival to ED pt complaining of diffuse abdominal pain, nausea, and vomiting. denies fever, chills, headache, dizziness, numbness/tingling, weakness, chest pain, sob

## 2023-10-05 NOTE — H&P ADULT - NSHPPHYSICALEXAM_GEN_ALL_CORE
ICU Vital Signs Last 24 Hrs  T(C): 36.6 (05 Oct 2023 23:48), Max: 36.6 (05 Oct 2023 23:48)  T(F): 97.8 (05 Oct 2023 23:48), Max: 97.8 (05 Oct 2023 23:48)  HR: 93 (05 Oct 2023 23:48) (87 - 100)  BP: 154/102 (05 Oct 2023 23:48) (154/102 - 176/100)  BP(mean): --  ABP: --  ABP(mean): --  RR: 18 (05 Oct 2023 23:48) (16 - 18)  SpO2: 99% (05 Oct 2023 23:48) (96% - 99%)    O2 Parameters below as of 05 Oct 2023 23:48  Patient On (Oxygen Delivery Method): nasal cannula  O2 Flow (L/min): 3      Constitutional: NAD, well-nourished, non toxic appearing   HEENT: Airway patent, moist MM, no erythema/swelling/deformity of oral structures. EOMI, PERRLA.  CV: regular rate, regular rhythm, well-perfused extremities, 2+ b/l DP and radial pulses equal.  Lungs: BCTA, no increased WOB.  ABD: NTND, no guarding or rebound, no pulsatile mass, no hernias.   MSK: Neck supple, nontender, nl ROM, no stepoff. Chest nontender. Back nontender in TLS spine or to b/l bony structures or flanks. Ext nontender, nl rom, no deformity.   INTEG: Skin warm, dry, no rash.  NEURO: A&Ox3, normal strength, nl sensation throughout, normal speech.   PSYCH: Denies SI/HI/hallucinations

## 2023-10-05 NOTE — ED PROVIDER NOTE - PHYSICAL EXAMINATION
GENERAL: Well-nourished, Well-developed. NAD.  HEAD: NCAT  Eyes: PERRLA, EOMI.  ENMT: MMM.   Neck: Supple. FROM  CVS: Normal S1,S2. No murmurs appreciated on auscultation   RESP: No use of accessory muscles. Chest rise symmetrical with good expansion. Lungs clear to auscultation B/L. No wheezing, rales, or rhonchi auscultated.  GI: Normal auscultation of bowel sounds in all 4 quadrants. (+)mild diffuse ttp. Soft, Nondistended. No guarding or rebound tenderness. No CVAT B/L.  Skin: Warm, Dry. No rashes or lesions. Good cap refill < 2 sec B/L.  EXT: Radial and pedal pulses present B/L. No calf tenderness or swelling B/L. No palpable cords. No pedal edema B/L.  Neuro: AA&O x 3. Speaking in full sentences. No slurring of speech. No facial droop. No tremors. Sensation grossly intact. Strength 5/5 B/L.  : testes nontender and descended b/l. penis erect, pt with penile pump (pt states he keeps pump inflated at all times due to difficulty urinating when cuff is deflated)

## 2023-10-05 NOTE — ED ADULT NURSE REASSESSMENT NOTE - NS ED NURSE REASSESS COMMENT FT1
BP is elevated at 174/100.  Med PA Irais contacted with medication ordered and given.  at next BP is 154/102.  To recheck in 40 minutes.

## 2023-10-05 NOTE — H&P ADULT - HISTORY OF PRESENT ILLNESS
pt is a 62 yo M pmhx HTN, HLD, DM, CVA presents to ER s/p seizure v syncopal event. Pt states he felt tired so we went to lay down and he "passed out", As per family pt had episode of LOC and generalized shaking for a few seconds. Family noted pt was recently started on Trulicity and put honey in his mouth during episode because they assumed he was hypoglycemic. No tongue bite, incontinence, or post ictal state.  On arrival to ER pt FS 95, pt AAOx3. C/o nausea. Denies fever, chills, cp, sob, v/d, abdominal pain. Denies urinary symptoms however  pt has penile balloon pump and states he can only urinate when it is erect.

## 2023-10-05 NOTE — H&P ADULT - NSHPREVIEWOFSYSTEMS_GEN_ALL_CORE
Constitutional: (-) fever (-) chills   Eyes: (-) visual changes  ENMT: (-) nasal or chest congestion (-) runny nose (-) sore throat (-) neck pain (-) neck stiffness  Cardiac: (-) chest pain (+) syncope  Respiratory: (-) cough (-) SOB  GI: (-) nausea (-) vomiting (-) diarrhea  : (-) incontinence  MS:(-) back pain   Neuro: (-) head injury (-) headache (-) dizziness (-) numbness/tingling to extremities B/L (-) LOC   Skin: (-) abrasion (-) rash (-) laceration  Except as documented in the HPI, all other systems are negative.

## 2023-10-05 NOTE — ED PROVIDER NOTE - CARE PLAN
Principal Discharge DX:	Syncope  Secondary Diagnosis:	Hypoglycemia   1 Principal Discharge DX:	Syncope

## 2023-10-05 NOTE — H&P ADULT - ASSESSMENT
64 yo M pmhx HTN, HLD, DM, CVA presents to ER s/p seizure v syncopal event.     # seizure v syncope   - admit to medicine   - monitor on tele   - trend troponin   - echo   - cardio duplex   - orthostatics   - CT head negative for acute pathology   - neuro consult     #elevated lactate   - IVF resuscitation   - trend     # HTN   - c/w home meds, amlodipine  - pt states he does not know the names of his medications, his wife will bring them tomorrow morning     # h/o CVA  # h/o HLD    - c/w DAPT     # h/o DM type 2   - FS AC QHS, a1c   - hold oral meds, ISS     # diet - DASH/CCHO   # dvt ppx - lovenox   # full code  62 yo M pmhx HTN, HLD, DM, CVA presents to ER s/p seizure v syncopal event.     # seizure v syncope   # age-indeterminate lacunar infarct   - admit to medicine   - monitor on tele   - trend troponin   - echo (if not recently preformed)  - orthostatics   - CT head notes age-indeterminate lacunar infarct  - neuro consult   - neuro checks     #elevated lactate   - IVF resuscitation   - trend     # HTN   - c/w home meds, amlodipine  - pt states he does not know the names of his medications, his wife will bring them tomorrow morning     # h/o CVA  # h/o HLD    - c/w DAPT     # h/o DM type 2   - FS AC QHS, a1c   - hold oral meds, ISS     # diet - DASH/CCHO   # dvt ppx - lovenox   # full code  62 yo M pmhx HTN, HLD, DM, CVA presents to ER s/p seizure v syncopal event.     # syncope 2/2 hypoglycemic episode 2/2 Trulicity   # age-indeterminate lacunar infarct     - monitor on tele overnight  - FS q 4 overnight  - CT head notes age-indeterminate lacunar infarct    #elevated lactate   - IVF resuscitation   - trend     # HTN   - c/w home meds, amlodipine  - pt states he does not know the names of his medications, his wife will bring them tomorrow morning     # h/o CVA  # h/o HLD    - c/w DAPT     # h/o DM type 2   - FS AC QHS, a1c   - hold oral meds, ISS     # diet - DASH/CCHO   # dvt ppx - lovenox   # full code     PLAN - d/c in am

## 2023-10-05 NOTE — H&P ADULT - NSHPLABSRESULTS_GEN_ALL_CORE
14.0   6.58  )-----------( 156      ( 05 Oct 2023 20:28 )             41.4   Urinalysis Basic - ( 05 Oct 2023 22:21 )    Color: Yellow / Appearance: Clear / S.024 / pH: x  Gluc: x / Ketone: Trace mg/dL  / Bili: Negative / Urobili: 0.2 mg/dL   Blood: x / Protein: 30 mg/dL / Nitrite: Negative   Leuk Esterase: Negative / RBC: 1 /HPF / WBC 1 /HPF   Sq Epi: x / Non Sq Epi: x / Bacteria: x    10-05    141  |  101  |  29<H>  ----------------------------<  164<H>  3.9   |  26  |  1.3    Ca    9.9      05 Oct 2023 20:28    TPro  6.9  /  Alb  4.7  /  TBili  0.4  /  DBili  x   /  AST  25  /  ALT  30  /  AlkPhos  53  10-

## 2023-10-05 NOTE — ED PROVIDER NOTE - CLINICAL SUMMARY MEDICAL DECISION MAKING FREE TEXT BOX
63 y.o. M, PMH of HTN, HLD, DM, CVA presents to ER s/p possible seizure vs syncopal event. Pt states he felt tired so we went to lay down and he "passed out". As per family, pt had large dinner, felt tired the whole day, went to lay down on the couch. All of a sudden family noticed that pt is not responding and appeared to have shaking episode for a few seconds. Family noted pt was recently started on Trulicity (3 wks ago), decided that episode might be due to hypoglycemia, they put honey in his mouth during episode. No tongue bite, incontinence, or post ictal state. On arrival to ER pt FS 95, pt AAOx3. C/o nausea. Denies fever, chills, CP/SOB, diarrhea. Denies urinary symptoms. On exam, pt in NAD, AAOx3, head NC/AT, CN II-XII intact, PEERL, EOMi, neck (-) midline tenderness, lungs CTA B/L, CV S1S2 regular, abdomen soft/NT/ND/(+)BS,  erection noted, concern for priapism but pt states that he has a penile pump and never deflates it, ext (-) edema, motor 5/5x4, sensation intact. Labs/CT/EKG done and reviewed. Syncope vs seizures. Unlikely hypoglycemia since episode happened after heavy dinner. Will admit pt for further work up.

## 2023-10-06 ENCOUNTER — TRANSCRIPTION ENCOUNTER (OUTPATIENT)
Age: 63
End: 2023-10-06

## 2023-10-06 VITALS
DIASTOLIC BLOOD PRESSURE: 91 MMHG | RESPIRATION RATE: 19 BRPM | HEART RATE: 84 BPM | TEMPERATURE: 97 F | SYSTOLIC BLOOD PRESSURE: 154 MMHG | OXYGEN SATURATION: 98 %

## 2023-10-06 LAB
A1C WITH ESTIMATED AVERAGE GLUCOSE RESULT: 5.7 % — HIGH (ref 4–5.6)
ALBUMIN SERPL ELPH-MCNC: 4.4 G/DL — SIGNIFICANT CHANGE UP (ref 3.5–5.2)
ALP SERPL-CCNC: 53 U/L — SIGNIFICANT CHANGE UP (ref 30–115)
ALT FLD-CCNC: 26 U/L — SIGNIFICANT CHANGE UP (ref 0–41)
ANION GAP SERPL CALC-SCNC: 14 MMOL/L — SIGNIFICANT CHANGE UP (ref 7–14)
AST SERPL-CCNC: 26 U/L — SIGNIFICANT CHANGE UP (ref 0–41)
BILIRUB SERPL-MCNC: 0.8 MG/DL — SIGNIFICANT CHANGE UP (ref 0.2–1.2)
BUN SERPL-MCNC: 24 MG/DL — HIGH (ref 10–20)
CALCIUM SERPL-MCNC: 9.4 MG/DL — SIGNIFICANT CHANGE UP (ref 8.4–10.5)
CHLORIDE SERPL-SCNC: 104 MMOL/L — SIGNIFICANT CHANGE UP (ref 98–110)
CO2 SERPL-SCNC: 24 MMOL/L — SIGNIFICANT CHANGE UP (ref 17–32)
CREAT SERPL-MCNC: 1.1 MG/DL — SIGNIFICANT CHANGE UP (ref 0.7–1.5)
EGFR: 75 ML/MIN/1.73M2 — SIGNIFICANT CHANGE UP
ESTIMATED AVERAGE GLUCOSE: 117 MG/DL — HIGH (ref 68–114)
GLUCOSE BLDC GLUCOMTR-MCNC: 86 MG/DL — SIGNIFICANT CHANGE UP (ref 70–99)
GLUCOSE SERPL-MCNC: 101 MG/DL — HIGH (ref 70–99)
LACTATE SERPL-SCNC: 1.7 MMOL/L — SIGNIFICANT CHANGE UP (ref 0.7–2)
POTASSIUM SERPL-MCNC: 4.4 MMOL/L — SIGNIFICANT CHANGE UP (ref 3.5–5)
POTASSIUM SERPL-SCNC: 4.4 MMOL/L — SIGNIFICANT CHANGE UP (ref 3.5–5)
PROT SERPL-MCNC: 6.4 G/DL — SIGNIFICANT CHANGE UP (ref 6–8)
SODIUM SERPL-SCNC: 142 MMOL/L — SIGNIFICANT CHANGE UP (ref 135–146)
TROPONIN T SERPL-MCNC: 0.01 NG/ML — SIGNIFICANT CHANGE UP

## 2023-10-06 RX ORDER — ENOXAPARIN SODIUM 100 MG/ML
40 INJECTION SUBCUTANEOUS EVERY 24 HOURS
Refills: 0 | Status: DISCONTINUED | OUTPATIENT
Start: 2023-10-06 | End: 2023-10-06

## 2023-10-06 RX ORDER — AMLODIPINE BESYLATE 2.5 MG/1
1 TABLET ORAL
Qty: 0 | Refills: 0 | DISCHARGE

## 2023-10-06 RX ADMIN — SODIUM CHLORIDE 100 MILLILITER(S): 9 INJECTION INTRAMUSCULAR; INTRAVENOUS; SUBCUTANEOUS at 01:21

## 2023-10-06 NOTE — DISCHARGE NOTE NURSING/CASE MANAGEMENT/SOCIAL WORK - NSDCPEFALRISK_GEN_ALL_CORE
For information on Fall & Injury Prevention, visit: [email or web address] OR  [email or web address] OR  https://www.cdc.gov/steadi/patient.html

## 2023-10-06 NOTE — CHART NOTE - NSCHARTNOTEFT_GEN_A_CORE
Called by medicine attending, patient stable for discharge.  LOC 2/2 hypoglycemia 2/2 Trulicity.  Advised to stop medication.  Continue with other home medications as ordered.  No need for Neuro c/s, will d/c.  FU with attending tomorrow at 1300.  Papers completed, orders placed.

## 2023-10-06 NOTE — DISCHARGE NOTE NURSING/CASE MANAGEMENT/SOCIAL WORK - PATIENT PORTAL LINK FT
You can access the FollowMyHealth Patient Portal offered by [facility] by registering at the following website: [email or web address]. By joining [facility]’s FollowMyHealth portal, you will also be able to view your health information using other applications (apps) compatible with our system.

## 2023-10-06 NOTE — DISCHARGE NOTE PROVIDER - NSDCCPCAREPLAN_GEN_ALL_CORE_FT
PRINCIPAL DISCHARGE DIAGNOSIS  Diagnosis: Syncope  Assessment and Plan of Treatment: This was because of low blood sugar because of Trulicity.  Stop this medicaiton.  FU with Dr. Wells tomorrow at 1pm

## 2023-10-06 NOTE — DISCHARGE NOTE PROVIDER - NSDCMRMEDTOKEN_GEN_ALL_CORE_FT
AMLODIPINE BESYLATE 5MG TABLETS: 1 each orally once a day TAKE 1 TABLET BY MOUTH EVERY DAY AT BEDTIME AS DIRECTED  aspirin 81 mg oral delayed release tablet: 1 tab(s) orally once a day  clopidogrel 75 mg oral tablet: 1 tab(s) orally once a day  finasteride 5 mg oral tablet: 1 tab(s) orally once a day  lisinopril-hydrochlorothiazide 20 mg-25 mg oral tablet: 1 tab(s) orally once a day  metFORMIN 500 mg oral tablet: 1 tab(s) orally 2 times a day  pioglitazone 30 mg oral tablet: 1 tab(s) orally once a day  rosuvastatin 40 mg oral tablet: 1 tab(s) orally once a day  tamsulosin 0.4 mg oral capsule: 1 cap(s) orally once a day

## 2023-10-06 NOTE — DISCHARGE NOTE PROVIDER - CARE PROVIDER_API CALL
Silviano Wells  Internal Medicine  [address] [place] [address]  Phone: [phone]  Fax: [phone]  Follow Up Time:

## 2023-10-06 NOTE — DISCHARGE NOTE PROVIDER - HOSPITAL COURSE
[Follow-up Visit ___] : a follow-up visit  for [unfilled] Patient admitted 2/2 hypoglycemic episode 2/2 Trulicity.  Patient received D50 w/improvement.  d/w attending, no need for Neuro consult, ok to discharge home.  D/C shasha.  FU with Dr. Wells tomorrow at 1300.

## 2023-10-18 DIAGNOSIS — Z79.82 LONG TERM (CURRENT) USE OF ASPIRIN: ICD-10-CM

## 2023-10-18 DIAGNOSIS — T38.3X5A ADVERSE EFFECT OF INSULIN AND ORAL HYPOGLYCEMIC [ANTIDIABETIC] DRUGS, INITIAL ENCOUNTER: ICD-10-CM

## 2023-10-18 DIAGNOSIS — I10 ESSENTIAL (PRIMARY) HYPERTENSION: ICD-10-CM

## 2023-10-18 DIAGNOSIS — Z86.73 PERSONAL HISTORY OF TRANSIENT ISCHEMIC ATTACK (TIA), AND CEREBRAL INFARCTION WITHOUT RESIDUAL DEFICITS: ICD-10-CM
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
## 2023-10-18 DIAGNOSIS — Z79.02 LONG TERM (CURRENT) USE OF ANTITHROMBOTICS/ANTIPLATELETS: ICD-10-CM

## 2023-10-18 DIAGNOSIS — E11.649 TYPE 2 DIABETES MELLITUS WITH HYPOGLYCEMIA WITHOUT COMA: ICD-10-CM

## 2023-10-18 DIAGNOSIS — E78.00 PURE HYPERCHOLESTEROLEMIA, UNSPECIFIED: ICD-10-CM

## 2023-10-18 DIAGNOSIS — Z79.84 LONG TERM (CURRENT) USE OF ORAL HYPOGLYCEMIC DRUGS: ICD-10-CM

## 2025-06-17 NOTE — ED PROVIDER NOTE - NS ED MD DISPO ISOLATION TYPES
Attempts was made  to do a courtesy post ED follow up call  and unable to get in contact with patient.    None